# Patient Record
Sex: FEMALE | Race: WHITE | NOT HISPANIC OR LATINO | Employment: FULL TIME | URBAN - METROPOLITAN AREA
[De-identification: names, ages, dates, MRNs, and addresses within clinical notes are randomized per-mention and may not be internally consistent; named-entity substitution may affect disease eponyms.]

---

## 2023-09-06 ENCOUNTER — OFFICE VISIT (OUTPATIENT)
Dept: OBSTETRICS AND GYNECOLOGY | Age: 28
End: 2023-09-06
Payer: COMMERCIAL

## 2023-09-06 VITALS
WEIGHT: 213.8 LBS | DIASTOLIC BLOOD PRESSURE: 68 MMHG | HEIGHT: 64 IN | SYSTOLIC BLOOD PRESSURE: 118 MMHG | BODY MASS INDEX: 36.5 KG/M2

## 2023-09-06 DIAGNOSIS — Z34.01 ENCOUNTER FOR SUPERVISION OF NORMAL FIRST PREGNANCY IN FIRST TRIMESTER: Primary | ICD-10-CM

## 2023-09-06 RX ORDER — ALBUTEROL SULFATE 90 UG/1
2 AEROSOL, METERED RESPIRATORY (INHALATION) EVERY 6 HOURS PRN
COMMUNITY

## 2023-09-06 RX ORDER — BUSPIRONE HYDROCHLORIDE 10 MG/1
TABLET ORAL
COMMUNITY

## 2023-09-06 NOTE — PROGRESS NOTES
" New GYN Problem    Chief Complaint   Patient presents with    Gynecologic Exam     New GYN, Pregnancy confirmation, LMP unknown, U/S today, Pt C/O nausea with some vomiting        Wilda GILMAN is a 28 yr old G1, P0 who presents at 10 weeks 5 days for confirmation of pregnancy.  She notes that she has some mild nausea, occasional vomiting.  Does not feel as though she needs any medication at this time.  No cramping or bleeding  Otherwise is feeling well  She is present with her  CJ today  She is a teacher at [employer], teaches freshman    Histories  Past Medical History:   Diagnosis Date    Anxiety     Asthma     Depression     Environmental and seasonal allergies     Polycystic ovary syndrome     Irregular cycles       Past Surgical History:   Procedure Laterality Date    WISDOM TOOTH EXTRACTION         Family History   Problem Relation Age of Onset    Diabetes Father     Osteoporosis Maternal Aunt     Osteoporosis Maternal Uncle     Breast cancer Neg Hx     Ovarian cancer Neg Hx     Uterine cancer Neg Hx     Colon cancer Neg Hx     Deep vein thrombosis Neg Hx     Pulmonary embolism Neg Hx     Congenital heart disease Neg Hx        Social History     Socioeconomic History    Marital status: Significant Other   Tobacco Use    Smoking status: Never    Smokeless tobacco: Never   Vaping Use    Vaping Use: Never used   Substance and Sexual Activity    Alcohol use: Yes     Comment: OCC    Drug use: Never    Sexual activity: Yes     Partners: Male     Birth control/protection: None       OB History          1    Para        Term                AB        Living             SAB        IAB        Ectopic        Molar        Multiple        Live Births                    Physical Exam    /68   Ht 162.6 cm (64\")   Wt 97 kg (213 lb 12.8 oz)   LMP  (LMP Unknown)   BMI 36.70 kg/m²     BMI: Body mass index is 36.7 kg/m².     General:   alert, appears stated age, and cooperative   Neck Nontender, no " lymphadenopathy, no thyromegaly   Abdomen: soft, non-tender, without masses or organomegaly     Assessment/Plan    Diagnoses and all orders for this visit:    1. Encounter for supervision of normal first pregnancy in first trimester (Primary)  -     OB Panel With HIV  -     Varicella Zoster Antibody, IgG  -     Urine Culture - Urine, Urine, Clean Catch  -     Chlamydia trachomatis, Neisseria gonorrhoeae, Trichomonas vaginalis, PCR - Urine, Urine, Clean Catch  -     Hemoglobinopathy Fractionation [place]  -     Anila Horizon 14 (Pan-Ethnic Standard) - Blood, Blood, Venous  -     Anila Panorama Prenatal Test: Chromosomes 13, 18, 21, X & Y: Triploidy 22Q.11.2 Deletion - Blood, Blood, Venous      Discussed usual course of prenatal care, pregnancy do's and don'ts, discussed flu vaccine when it comes out.  She desires genetic screening and this was collected today  She has a family history of diabetes and this may put her at risk of gestational diabetes  Discussed recommended weight gain during pregnancy    Return for 3-4 ob intake please.      Stephanie Tadeo MD  09/06/2023  16:04 EDT

## 2023-09-07 LAB
ABO GROUP BLD: NORMAL
BASOPHILS # BLD AUTO: 0 X10E3/UL (ref 0–0.2)
BASOPHILS NFR BLD AUTO: 0 %
BLD GP AB SCN SERPL QL: NEGATIVE
EOSINOPHIL # BLD AUTO: 0.1 X10E3/UL (ref 0–0.4)
EOSINOPHIL NFR BLD AUTO: 1 %
ERYTHROCYTE [DISTWIDTH] IN BLOOD BY AUTOMATED COUNT: 12.8 % (ref 11.7–15.4)
HBV SURFACE AG SERPL QL IA: NEGATIVE
HCT VFR BLD AUTO: 36.4 % (ref 34–46.6)
HCV IGG SERPL QL IA: NON REACTIVE
HGB A MFR BLD ELPH: 97.4 % (ref 96.4–98.8)
HGB A2 MFR BLD ELPH: 2.6 % (ref 1.8–3.2)
HGB BLD-MCNC: 12.2 G/DL (ref 11.1–15.9)
HGB F MFR BLD ELPH: 0 % (ref 0–2)
HGB FRACT BLD-IMP: NORMAL
HGB S MFR BLD ELPH: 0 %
HIV 1+2 AB+HIV1 P24 AG SERPL QL IA: NON REACTIVE
IMM GRANULOCYTES # BLD AUTO: 0 X10E3/UL (ref 0–0.1)
IMM GRANULOCYTES NFR BLD AUTO: 0 %
LYMPHOCYTES # BLD AUTO: 2.2 X10E3/UL (ref 0.7–3.1)
LYMPHOCYTES NFR BLD AUTO: 24 %
MCH RBC QN AUTO: 29.2 PG (ref 26.6–33)
MCHC RBC AUTO-ENTMCNC: 33.5 G/DL (ref 31.5–35.7)
MCV RBC AUTO: 87 FL (ref 79–97)
MONOCYTES # BLD AUTO: 0.8 X10E3/UL (ref 0.1–0.9)
MONOCYTES NFR BLD AUTO: 9 %
NEUTROPHILS # BLD AUTO: 6.1 X10E3/UL (ref 1.4–7)
NEUTROPHILS NFR BLD AUTO: 66 %
PLATELET # BLD AUTO: 367 X10E3/UL (ref 150–450)
RBC # BLD AUTO: 4.18 X10E6/UL (ref 3.77–5.28)
RH BLD: POSITIVE
RPR SER QL: NON REACTIVE
RUBV IGG SERPL IA-ACNC: 14.4 INDEX
VZV IGG SER IA-ACNC: <135 INDEX
WBC # BLD AUTO: 9.2 X10E3/UL (ref 3.4–10.8)

## 2023-09-08 LAB
BACTERIA UR CULT: NORMAL
BACTERIA UR CULT: NORMAL
C TRACH RRNA SPEC QL NAA+PROBE: NEGATIVE
N GONORRHOEA RRNA SPEC QL NAA+PROBE: NEGATIVE
T VAGINALIS RRNA SPEC QL NAA+PROBE: NEGATIVE

## 2023-09-10 ENCOUNTER — PATIENT MESSAGE (OUTPATIENT)
Dept: OBSTETRICS AND GYNECOLOGY | Age: 28
End: 2023-09-10
Payer: COMMERCIAL

## 2023-09-12 RX ORDER — CLINDAMYCIN AND BENZOYL PEROXIDE 10; 50 MG/G; MG/G
1 GEL TOPICAL 2 TIMES DAILY
Qty: 35 G | Refills: 1 | Status: SHIPPED | OUTPATIENT
Start: 2023-09-12

## 2023-09-12 NOTE — TELEPHONE ENCOUNTER
From: Wilda GILMAN  To: Stephanie Tadeo  Sent: 9/10/2023 6:41 PM EDT  Subject: Acne     Hi! My face has been breaking out with acne on my cheeks and chin. I’ve never had acne this bad before. I’ve been using my usual face  and lotions. I also just started to add in some Ramírez’s skin therapy oil - which may help. But I was wondering if you had any suggestions to help.

## 2023-09-13 LAB
Lab: NEGATIVE
Lab: NORMAL
NTRA ALPHA-THALASSEMIA: NEGATIVE
NTRA BETA-HEMOGLOBINOPATHIES: NEGATIVE
NTRA CANAVAN DISEASE: NEGATIVE
NTRA CYSTIC FIBROSIS: NEGATIVE
NTRA DUCHENNE/BECKER MUSCULAR DYSTROPHY: NEGATIVE
NTRA FAMILIAL DYSAUTONOMIA: NEGATIVE
NTRA FRAGILE X SYNDROME: NEGATIVE
NTRA GALACTOSEMIA: NEGATIVE
NTRA GAUCHER DISEASE: NEGATIVE
NTRA MEDIUM CHAIN ACYL-COA DEHYDROGENASE DEFICIENCY: NEGATIVE
NTRA POLYCYSTIC KIDNEY DISEASE, AUTOSOMAL RECESSIVE: NEGATIVE
NTRA SMITH-LEMLI-OPITZ SYNDROME: NEGATIVE
NTRA SPINAL MUSCULAR ATROPHY: NEGATIVE
NTRA TAY-SACHS DISEASE: NEGATIVE

## 2023-09-27 ENCOUNTER — ROUTINE PRENATAL (OUTPATIENT)
Dept: OBSTETRICS AND GYNECOLOGY | Age: 28
End: 2023-09-27
Payer: COMMERCIAL

## 2023-09-27 VITALS — WEIGHT: 213.2 LBS | SYSTOLIC BLOOD PRESSURE: 122 MMHG | DIASTOLIC BLOOD PRESSURE: 74 MMHG | BODY MASS INDEX: 36.6 KG/M2

## 2023-09-27 DIAGNOSIS — Z13.89 SCREENING FOR BLOOD OR PROTEIN IN URINE: ICD-10-CM

## 2023-09-27 DIAGNOSIS — Z34.81 PRENATAL CARE, SUBSEQUENT PREGNANCY, FIRST TRIMESTER: ICD-10-CM

## 2023-09-27 DIAGNOSIS — Z01.419 ENCOUNTER FOR GYNECOLOGICAL EXAMINATION WITHOUT ABNORMAL FINDING: Primary | ICD-10-CM

## 2023-09-27 LAB
GLUCOSE UR STRIP-MCNC: NEGATIVE MG/DL
PROT UR STRIP-MCNC: NEGATIVE MG/DL

## 2023-09-27 NOTE — PROGRESS NOTES
Chief Complaint   Patient presents with    Routine Prenatal Visit     13w5d OB Check      Wilda Evans is doing well, no complaints  Genetics came back with no result, recollected today  FHR around 170  Pap collected today, normal closed cervix    Stephanie Tadeo MD  9/27/2023  14:20 EDT

## 2023-10-01 LAB
CONV .: NORMAL
CYTOLOGIST CVX/VAG CYTO: NORMAL
CYTOLOGY CVX/VAG DOC CYTO: NORMAL
CYTOLOGY CVX/VAG DOC THIN PREP: NORMAL
DX ICD CODE: NORMAL
HIV 1 & 2 AB SER-IMP: NORMAL
OTHER STN SPEC: NORMAL
STAT OF ADQ CVX/VAG CYTO-IMP: NORMAL

## 2023-10-19 ENCOUNTER — ROUTINE PRENATAL (OUTPATIENT)
Dept: OBSTETRICS AND GYNECOLOGY | Age: 28
End: 2023-10-19
Payer: COMMERCIAL

## 2023-10-19 VITALS — BODY MASS INDEX: 36.73 KG/M2 | SYSTOLIC BLOOD PRESSURE: 120 MMHG | DIASTOLIC BLOOD PRESSURE: 70 MMHG | WEIGHT: 214 LBS

## 2023-10-19 DIAGNOSIS — Z34.92 PRENATAL CARE IN SECOND TRIMESTER: Primary | ICD-10-CM

## 2023-10-19 DIAGNOSIS — Z3A.13 13 WEEKS GESTATION OF PREGNANCY: ICD-10-CM

## 2023-10-19 LAB
GLUCOSE UR STRIP-MCNC: NEGATIVE MG/DL
PROT UR STRIP-MCNC: ABNORMAL MG/DL

## 2023-10-19 NOTE — PROGRESS NOTES
Chief Complaint   Patient presents with    Routine Prenatal Visit     16 weeks  Cc:  no problems       HPI: 28 y.o.  at 16w6d     Doing well  No LOF or bleeding  Her repeat genetics still returned with insufficient fetal DNA  Pt of Dr. Tadeo       Vitals:    10/19/23 0948   BP: 120/70   Weight: 97.1 kg (214 lb)       ROS:  GI:  Negative  : Negative  Pulmonary: Negative     A/P  1. Intrauterine pregnancy at 16w6d   2. Pregnancy Risk:  NORMAL    Diagnoses and all orders for this visit:    1. Prenatal care in second trimester (Primary)    2. 13 weeks gestation of pregnancy  -     POC Urinalysis Dipstick    Other orders  -     Fluzone >6 Months (4222-4120)        -----------------------  PLAN:   Flu vaccine today  Message to Dr. Tadeo regarding f/u for genetics   Anatomy next visit  Return for Next scheduled follow up.      ORLANDO Nelson  10/19/2023 10:01 EDT

## 2023-11-08 ENCOUNTER — ROUTINE PRENATAL (OUTPATIENT)
Dept: OBSTETRICS AND GYNECOLOGY | Age: 28
End: 2023-11-08
Payer: COMMERCIAL

## 2023-11-08 VITALS — SYSTOLIC BLOOD PRESSURE: 116 MMHG | DIASTOLIC BLOOD PRESSURE: 74 MMHG | BODY MASS INDEX: 36.97 KG/M2 | WEIGHT: 215.4 LBS

## 2023-11-08 DIAGNOSIS — Z13.89 SCREENING FOR BLOOD OR PROTEIN IN URINE: Primary | ICD-10-CM

## 2023-11-08 DIAGNOSIS — Z34.82 ENCOUNTER FOR SUPERVISION OF OTHER NORMAL PREGNANCY IN SECOND TRIMESTER: ICD-10-CM

## 2023-11-08 LAB
GLUCOSE UR STRIP-MCNC: NEGATIVE MG/DL
PROT UR STRIP-MCNC: NEGATIVE MG/DL
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
## 2023-11-13 DIAGNOSIS — Z34.82 PRENATAL CARE, SUBSEQUENT PREGNANCY, SECOND TRIMESTER: Primary | ICD-10-CM

## 2023-12-06 ENCOUNTER — ROUTINE PRENATAL (OUTPATIENT)
Dept: OBSTETRICS AND GYNECOLOGY | Age: 28
End: 2023-12-06
Payer: COMMERCIAL

## 2023-12-06 VITALS — DIASTOLIC BLOOD PRESSURE: 68 MMHG | BODY MASS INDEX: 37.45 KG/M2 | SYSTOLIC BLOOD PRESSURE: 120 MMHG | WEIGHT: 218.2 LBS

## 2023-12-06 DIAGNOSIS — J45.909 ASTHMA DURING PREGNANCY: ICD-10-CM

## 2023-12-06 DIAGNOSIS — Z34.82 ENCOUNTER FOR SUPERVISION OF OTHER NORMAL PREGNANCY IN SECOND TRIMESTER: ICD-10-CM

## 2023-12-06 DIAGNOSIS — Z13.89 SCREENING FOR BLOOD OR PROTEIN IN URINE: Primary | ICD-10-CM

## 2023-12-06 DIAGNOSIS — O99.519 ASTHMA DURING PREGNANCY: ICD-10-CM

## 2023-12-06 DIAGNOSIS — L70.9 ACNE, UNSPECIFIED ACNE TYPE: ICD-10-CM

## 2023-12-06 PROBLEM — Z34.90 SUPERVISION OF NORMAL PREGNANCY: Status: ACTIVE | Noted: 2023-12-06

## 2023-12-06 LAB
GLUCOSE UR STRIP-MCNC: NEGATIVE MG/DL
PROT UR STRIP-MCNC: ABNORMAL MG/DL

## 2023-12-06 RX ORDER — CLINDAMYCIN AND BENZOYL PEROXIDE 10; 50 MG/G; MG/G
1 GEL TOPICAL 2 TIMES DAILY
Qty: 35 G | Refills: 11 | Status: SHIPPED | OUTPATIENT
Start: 2023-12-06

## 2023-12-07 NOTE — PROGRESS NOTES
Chief Complaint   Patient presents with    Routine Prenatal Visit     23w5d OB Check with repeat anatomy us      Wilda Evans is doing well.  Her anatomy screen follow-up today is normal and complete  Her genetic screening was low risk and male  The BenzaClin acne treatment has been working well for her, she just needs a refill of this and it was sent in today    Follow-up in about 4 weeks with GCT    Stephanie Tadeo MD  12/7/2023  14:20 EST

## 2024-01-03 ENCOUNTER — ROUTINE PRENATAL (OUTPATIENT)
Dept: OBSTETRICS AND GYNECOLOGY | Age: 29
End: 2024-01-03
Payer: COMMERCIAL

## 2024-01-03 VITALS — WEIGHT: 220 LBS | BODY MASS INDEX: 37.76 KG/M2 | DIASTOLIC BLOOD PRESSURE: 70 MMHG | SYSTOLIC BLOOD PRESSURE: 118 MMHG

## 2024-01-03 DIAGNOSIS — Z13.1 SCREENING FOR DIABETES MELLITUS: ICD-10-CM

## 2024-01-03 DIAGNOSIS — Z13.0 SCREENING FOR IRON DEFICIENCY ANEMIA: ICD-10-CM

## 2024-01-03 DIAGNOSIS — Z34.82 ENCOUNTER FOR SUPERVISION OF OTHER NORMAL PREGNANCY IN SECOND TRIMESTER: ICD-10-CM

## 2024-01-03 DIAGNOSIS — Z13.89 SCREENING FOR BLOOD OR PROTEIN IN URINE: Primary | ICD-10-CM

## 2024-01-03 LAB
GLUCOSE UR STRIP-MCNC: NEGATIVE MG/DL
PROT UR STRIP-MCNC: NEGATIVE MG/DL

## 2024-01-03 NOTE — PROGRESS NOTES
Chief Complaint   Patient presents with    Routine Prenatal Visit     27w5d OB Check, 1 HR GTT     Wilda Cristina is doing well. Normal movement and uterus growing  GCT, tdap today  Epidural, breast, likely ocps PP  Discussed signing up for classes and picking peds  FU 2 wks    Stephanie Tadeo MD  1/3/2024  11:37 EST

## 2024-01-04 LAB
BASOPHILS # BLD AUTO: 0 X10E3/UL (ref 0–0.2)
BASOPHILS NFR BLD AUTO: 0 %
EOSINOPHIL # BLD AUTO: 0 X10E3/UL (ref 0–0.4)
EOSINOPHIL NFR BLD AUTO: 0 %
ERYTHROCYTE [DISTWIDTH] IN BLOOD BY AUTOMATED COUNT: 12.9 % (ref 11.7–15.4)
GLUCOSE 1H P 50 G GLC PO SERPL-MCNC: 100 MG/DL (ref 70–139)
HCT VFR BLD AUTO: 36.7 % (ref 34–46.6)
HGB BLD-MCNC: 12.5 G/DL (ref 11.1–15.9)
IMM GRANULOCYTES # BLD AUTO: 0.1 X10E3/UL (ref 0–0.1)
IMM GRANULOCYTES NFR BLD AUTO: 1 %
LYMPHOCYTES # BLD AUTO: 1.4 X10E3/UL (ref 0.7–3.1)
LYMPHOCYTES NFR BLD AUTO: 13 %
MCH RBC QN AUTO: 29.2 PG (ref 26.6–33)
MCHC RBC AUTO-ENTMCNC: 34.1 G/DL (ref 31.5–35.7)
MCV RBC AUTO: 86 FL (ref 79–97)
MONOCYTES # BLD AUTO: 0.6 X10E3/UL (ref 0.1–0.9)
MONOCYTES NFR BLD AUTO: 6 %
NEUTROPHILS # BLD AUTO: 8.5 X10E3/UL (ref 1.4–7)
NEUTROPHILS NFR BLD AUTO: 80 %
PLATELET # BLD AUTO: 337 X10E3/UL (ref 150–450)
RBC # BLD AUTO: 4.28 X10E6/UL (ref 3.77–5.28)
RPR SER QL: NON REACTIVE
WBC # BLD AUTO: 10.7 X10E3/UL (ref 3.4–10.8)

## 2024-01-17 ENCOUNTER — ROUTINE PRENATAL (OUTPATIENT)
Dept: OBSTETRICS AND GYNECOLOGY | Age: 29
End: 2024-01-17
Payer: COMMERCIAL

## 2024-01-17 VITALS — DIASTOLIC BLOOD PRESSURE: 74 MMHG | BODY MASS INDEX: 38.79 KG/M2 | SYSTOLIC BLOOD PRESSURE: 120 MMHG | WEIGHT: 226 LBS

## 2024-01-17 DIAGNOSIS — Z13.89 SCREENING FOR BLOOD OR PROTEIN IN URINE: Primary | ICD-10-CM

## 2024-01-17 DIAGNOSIS — Z34.82 ENCOUNTER FOR SUPERVISION OF OTHER NORMAL PREGNANCY IN SECOND TRIMESTER: ICD-10-CM

## 2024-01-17 LAB
GLUCOSE UR STRIP-MCNC: NEGATIVE MG/DL
PROT UR STRIP-MCNC: NEGATIVE MG/DL

## 2024-01-17 NOTE — PROGRESS NOTES
Chief Complaint   Patient presents with    Routine Prenatal Visit     29w5d OB Check      Wilda Evans is doing well, just notes increase in fatigue lately.  Normal fetal movement.  No significant contractions noted.    Normal glucose and no anemia  Discussed third trimester fatigue, if needs to stop work can do so during the third trimester  Desires epidural, plans to breast-feed  Has signed up for classes    Follow-up in 2 weeks with RSV    Stephanie Tadeo MD  1/17/2024  14:23 EST

## 2024-01-21 ENCOUNTER — PATIENT MESSAGE (OUTPATIENT)
Dept: OBSTETRICS AND GYNECOLOGY | Age: 29
End: 2024-01-21
Payer: COMMERCIAL

## 2024-01-22 NOTE — TELEPHONE ENCOUNTER
From: Wilda Evans  To: Stephanie Tadeo  Sent: 1/21/2024 2:33 PM EST  Subject: Colostrum coming in!     Is it too early to collect colostrum? It’s mostly just dripping occasionally on its own but there’s a good amount if I hand express.

## 2024-01-31 ENCOUNTER — ROUTINE PRENATAL (OUTPATIENT)
Dept: OBSTETRICS AND GYNECOLOGY | Age: 29
End: 2024-01-31
Payer: COMMERCIAL

## 2024-01-31 VITALS — DIASTOLIC BLOOD PRESSURE: 68 MMHG | BODY MASS INDEX: 39.82 KG/M2 | WEIGHT: 232 LBS | SYSTOLIC BLOOD PRESSURE: 120 MMHG

## 2024-01-31 DIAGNOSIS — Z34.83 ENCOUNTER FOR SUPERVISION OF OTHER NORMAL PREGNANCY IN THIRD TRIMESTER: ICD-10-CM

## 2024-01-31 DIAGNOSIS — Z13.89 SCREENING FOR BLOOD OR PROTEIN IN URINE: Primary | ICD-10-CM

## 2024-01-31 LAB
GLUCOSE UR STRIP-MCNC: NEGATIVE MG/DL
PROT UR STRIP-MCNC: ABNORMAL MG/DL

## 2024-02-17 ENCOUNTER — HOSPITAL ENCOUNTER (INPATIENT)
Facility: HOSPITAL | Age: 29
LOS: 6 days | Discharge: HOME | End: 2024-02-23
Attending: OBSTETRICS & GYNECOLOGY | Admitting: OBSTETRICS & GYNECOLOGY
Payer: COMMERCIAL

## 2024-02-17 ENCOUNTER — ANESTHESIA (OUTPATIENT)
Dept: LABOR AND DELIVERY | Facility: HOSPITAL | Age: 29
End: 2024-02-17
Payer: COMMERCIAL

## 2024-02-17 ENCOUNTER — ANESTHESIA EVENT (OUTPATIENT)
Dept: LABOR AND DELIVERY | Facility: HOSPITAL | Age: 29
End: 2024-02-17
Payer: COMMERCIAL

## 2024-02-17 PROBLEM — O14.13 PREECLAMPSIA, SEVERE, THIRD TRIMESTER: Status: ACTIVE | Noted: 2024-02-17

## 2024-02-17 PROBLEM — Z34.90 PREGNANCY: Status: ACTIVE | Noted: 2024-02-17

## 2024-02-17 PROBLEM — O14.13 SEVERE PRE-ECLAMPSIA IN THIRD TRIMESTER: Status: ACTIVE | Noted: 2024-02-17

## 2024-02-17 LAB
ABO GROUP BLD: NORMAL
ALBUMIN SERPL-MCNC: 3.6 G/DL (ref 3.5–5.2)
ALBUMIN/GLOB SERPL: 1 G/DL
ALP SERPL-CCNC: 178 U/L (ref 39–117)
ALT SERPL W P-5'-P-CCNC: 43 U/L (ref 1–33)
ANION GAP SERPL CALCULATED.3IONS-SCNC: 13.7 MMOL/L (ref 5–15)
AST SERPL-CCNC: 39 U/L (ref 1–32)
BACTERIA UR QL AUTO: ABNORMAL /HPF
BASOPHILS # BLD AUTO: 0.03 10*3/MM3 (ref 0–0.2)
BASOPHILS NFR BLD AUTO: 0.2 % (ref 0–1.5)
BILIRUB SERPL-MCNC: 0.3 MG/DL (ref 0–1.2)
BILIRUB UR QL STRIP: NEGATIVE
BLD GP AB SCN SERPL QL: NEGATIVE
BUN SERPL-MCNC: 11 MG/DL (ref 6–20)
BUN/CREAT SERPL: 17.5 (ref 7–25)
CALCIUM SPEC-SCNC: 8.9 MG/DL (ref 8.6–10.5)
CHLORIDE SERPL-SCNC: 102 MMOL/L (ref 98–107)
CLARITY UR: ABNORMAL
CO2 SERPL-SCNC: 20.3 MMOL/L (ref 22–29)
COLOR UR: YELLOW
CREAT SERPL-MCNC: 0.63 MG/DL (ref 0.57–1)
CREAT UR-MCNC: 179.2 MG/DL
DEPRECATED RDW RBC AUTO: 39.9 FL (ref 37–54)
EGFRCR SERPLBLD CKD-EPI 2021: 123.3 ML/MIN/1.73
EOSINOPHIL # BLD AUTO: 0.08 10*3/MM3 (ref 0–0.4)
EOSINOPHIL NFR BLD AUTO: 0.6 % (ref 0.3–6.2)
ERYTHROCYTE [DISTWIDTH] IN BLOOD BY AUTOMATED COUNT: 13 % (ref 12.3–15.4)
EXTERNAL GROUP B STREP ANTIGEN: NORMAL
GLOBULIN UR ELPH-MCNC: 3.6 GM/DL
GLUCOSE SERPL-MCNC: 87 MG/DL (ref 65–99)
GLUCOSE UR STRIP-MCNC: NEGATIVE MG/DL
HCT VFR BLD AUTO: 36.6 % (ref 34–46.6)
HGB BLD-MCNC: 11.9 G/DL (ref 12–15.9)
HGB UR QL STRIP.AUTO: NEGATIVE
HYALINE CASTS UR QL AUTO: ABNORMAL /LPF
IMM GRANULOCYTES # BLD AUTO: 0.13 10*3/MM3 (ref 0–0.05)
IMM GRANULOCYTES NFR BLD AUTO: 1.1 % (ref 0–0.5)
KETONES UR QL STRIP: NEGATIVE
LEUKOCYTE ESTERASE UR QL STRIP.AUTO: ABNORMAL
LYMPHOCYTES # BLD AUTO: 1.63 10*3/MM3 (ref 0.7–3.1)
LYMPHOCYTES NFR BLD AUTO: 13.2 % (ref 19.6–45.3)
MCH RBC QN AUTO: 27.8 PG (ref 26.6–33)
MCHC RBC AUTO-ENTMCNC: 32.5 G/DL (ref 31.5–35.7)
MCV RBC AUTO: 85.5 FL (ref 79–97)
MONOCYTES # BLD AUTO: 1 10*3/MM3 (ref 0.1–0.9)
MONOCYTES NFR BLD AUTO: 8.1 % (ref 5–12)
NEUTROPHILS NFR BLD AUTO: 76.8 % (ref 42.7–76)
NEUTROPHILS NFR BLD AUTO: 9.49 10*3/MM3 (ref 1.7–7)
NITRITE UR QL STRIP: NEGATIVE
NRBC BLD AUTO-RTO: 0.1 /100 WBC (ref 0–0.2)
PH UR STRIP.AUTO: 6.5 [PH] (ref 5–8)
PLATELET # BLD AUTO: 278 10*3/MM3 (ref 140–450)
PMV BLD AUTO: 10.9 FL (ref 6–12)
POTASSIUM SERPL-SCNC: 4 MMOL/L (ref 3.5–5.2)
PROT ?TM UR-MCNC: 66.7 MG/DL
PROT SERPL-MCNC: 7.2 G/DL (ref 6–8.5)
PROT UR QL STRIP: ABNORMAL
PROT/CREAT UR: 372.2 MG/G CREA (ref 0–200)
RBC # BLD AUTO: 4.28 10*6/MM3 (ref 3.77–5.28)
RBC # UR STRIP: ABNORMAL /HPF
REF LAB TEST METHOD: ABNORMAL
RH BLD: POSITIVE
SODIUM SERPL-SCNC: 136 MMOL/L (ref 136–145)
SP GR UR STRIP: 1.02 (ref 1–1.03)
SQUAMOUS #/AREA URNS HPF: ABNORMAL /HPF
T PALLIDUM IGG SER QL: NORMAL
T&S EXPIRATION DATE: NORMAL
UROBILINOGEN UR QL STRIP: ABNORMAL
WBC # UR STRIP: ABNORMAL /HPF
WBC NRBC COR # BLD AUTO: 12.36 10*3/MM3 (ref 3.4–10.8)

## 2024-02-17 PROCEDURE — 82570 ASSAY OF URINE CREATININE: CPT | Performed by: OBSTETRICS & GYNECOLOGY

## 2024-02-17 PROCEDURE — 86900 BLOOD TYPING SEROLOGIC ABO: CPT | Performed by: STUDENT IN AN ORGANIZED HEALTH CARE EDUCATION/TRAINING PROGRAM

## 2024-02-17 PROCEDURE — 99202 OFFICE O/P NEW SF 15 MIN: CPT | Performed by: OBSTETRICS & GYNECOLOGY

## 2024-02-17 PROCEDURE — 84156 ASSAY OF PROTEIN URINE: CPT | Performed by: OBSTETRICS & GYNECOLOGY

## 2024-02-17 PROCEDURE — 25010000002 BETAMETHASONE ACET & SOD PHOS PER 4 MG: Performed by: OBSTETRICS & GYNECOLOGY

## 2024-02-17 PROCEDURE — 86780 TREPONEMA PALLIDUM: CPT | Performed by: STUDENT IN AN ORGANIZED HEALTH CARE EDUCATION/TRAINING PROGRAM

## 2024-02-17 PROCEDURE — 81001 URINALYSIS AUTO W/SCOPE: CPT | Performed by: OBSTETRICS & GYNECOLOGY

## 2024-02-17 PROCEDURE — 25010000002 MAGNESIUM SULFATE 20 GM/500ML SOLUTION: Performed by: OBSTETRICS & GYNECOLOGY

## 2024-02-17 PROCEDURE — 86850 RBC ANTIBODY SCREEN: CPT | Performed by: STUDENT IN AN ORGANIZED HEALTH CARE EDUCATION/TRAINING PROGRAM

## 2024-02-17 PROCEDURE — 87081 CULTURE SCREEN ONLY: CPT | Performed by: STUDENT IN AN ORGANIZED HEALTH CARE EDUCATION/TRAINING PROGRAM

## 2024-02-17 PROCEDURE — 85025 COMPLETE CBC W/AUTO DIFF WBC: CPT | Performed by: OBSTETRICS & GYNECOLOGY

## 2024-02-17 PROCEDURE — 86901 BLOOD TYPING SEROLOGIC RH(D): CPT | Performed by: STUDENT IN AN ORGANIZED HEALTH CARE EDUCATION/TRAINING PROGRAM

## 2024-02-17 PROCEDURE — 80053 COMPREHEN METABOLIC PANEL: CPT | Performed by: OBSTETRICS & GYNECOLOGY

## 2024-02-17 PROCEDURE — 87086 URINE CULTURE/COLONY COUNT: CPT | Performed by: OBSTETRICS & GYNECOLOGY

## 2024-02-17 RX ORDER — PENICILLIN G 3000000 [IU]/50ML
3 INJECTION, SOLUTION INTRAVENOUS EVERY 4 HOURS
Status: DISCONTINUED | OUTPATIENT
Start: 2024-02-18 | End: 2024-02-19

## 2024-02-17 RX ORDER — MAGNESIUM SULFATE HEPTAHYDRATE 40 MG/ML
2 INJECTION, SOLUTION INTRAVENOUS CONTINUOUS
Status: DISCONTINUED | OUTPATIENT
Start: 2024-02-17 | End: 2024-02-19

## 2024-02-17 RX ORDER — ONDANSETRON 2 MG/ML
4 INJECTION INTRAMUSCULAR; INTRAVENOUS EVERY 8 HOURS PRN
Status: DISCONTINUED | OUTPATIENT
Start: 2024-02-17 | End: 2024-02-19 | Stop reason: HOSPADM

## 2024-02-17 RX ORDER — ONDANSETRON 4 MG/1
8 TABLET, ORALLY DISINTEGRATING ORAL EVERY 8 HOURS PRN
Status: DISCONTINUED | OUTPATIENT
Start: 2024-02-17 | End: 2024-02-19 | Stop reason: HOSPADM

## 2024-02-17 RX ORDER — CALCIUM CARBONATE 500 MG/1
2 TABLET, CHEWABLE ORAL DAILY PRN
Status: DISCONTINUED | OUTPATIENT
Start: 2024-02-17 | End: 2024-02-19 | Stop reason: HOSPADM

## 2024-02-17 RX ORDER — LIDOCAINE HYDROCHLORIDE 10 MG/ML
0.5 INJECTION, SOLUTION INFILTRATION; PERINEURAL ONCE AS NEEDED
Status: DISCONTINUED | OUTPATIENT
Start: 2024-02-17 | End: 2024-02-19

## 2024-02-17 RX ORDER — EPHEDRINE SULFATE 50 MG/ML
5 INJECTION, SOLUTION INTRAVENOUS
Status: DISCONTINUED | OUTPATIENT
Start: 2024-02-17 | End: 2024-02-19 | Stop reason: HOSPADM

## 2024-02-17 RX ORDER — NIFEDIPINE 10 MG/1
10-20 CAPSULE ORAL
Status: DISCONTINUED | OUTPATIENT
Start: 2024-02-17 | End: 2024-02-19

## 2024-02-17 RX ORDER — SODIUM CHLORIDE 0.9 % (FLUSH) 0.9 %
10 SYRINGE (ML) INJECTION EVERY 12 HOURS SCHEDULED
Status: DISCONTINUED | OUTPATIENT
Start: 2024-02-17 | End: 2024-02-19

## 2024-02-17 RX ORDER — PENICILLIN G 3000000 [IU]/50ML
3 INJECTION, SOLUTION INTRAVENOUS EVERY 4 HOURS
Status: DISCONTINUED | OUTPATIENT
Start: 2024-02-17 | End: 2024-02-17

## 2024-02-17 RX ORDER — BISACODYL 10 MG
10 SUPPOSITORY, RECTAL RECTAL DAILY PRN
Status: DISCONTINUED | OUTPATIENT
Start: 2024-02-17 | End: 2024-02-19 | Stop reason: HOSPADM

## 2024-02-17 RX ORDER — ACETAMINOPHEN 325 MG/1
650 TABLET ORAL EVERY 4 HOURS PRN
Status: DISCONTINUED | OUTPATIENT
Start: 2024-02-17 | End: 2024-02-19 | Stop reason: HOSPADM

## 2024-02-17 RX ORDER — BETAMETHASONE SODIUM PHOSPHATE AND BETAMETHASONE ACETATE 3; 3 MG/ML; MG/ML
12 INJECTION, SUSPENSION INTRA-ARTICULAR; INTRALESIONAL; INTRAMUSCULAR; SOFT TISSUE EVERY 24 HOURS
Status: COMPLETED | OUTPATIENT
Start: 2024-02-17 | End: 2024-02-18

## 2024-02-17 RX ORDER — ONDANSETRON 2 MG/ML
4 INJECTION INTRAMUSCULAR; INTRAVENOUS EVERY 6 HOURS PRN
Status: DISCONTINUED | OUTPATIENT
Start: 2024-02-17 | End: 2024-02-19

## 2024-02-17 RX ORDER — HYDRALAZINE HYDROCHLORIDE 20 MG/ML
5-10 INJECTION INTRAMUSCULAR; INTRAVENOUS
Status: DISCONTINUED | OUTPATIENT
Start: 2024-02-17 | End: 2024-02-19

## 2024-02-17 RX ORDER — DIPHENHYDRAMINE HYDROCHLORIDE 50 MG/ML
25 INJECTION INTRAMUSCULAR; INTRAVENOUS NIGHTLY PRN
Status: DISCONTINUED | OUTPATIENT
Start: 2024-02-17 | End: 2024-02-19

## 2024-02-17 RX ORDER — OMEPRAZOLE 20 MG/1
20 CAPSULE, DELAYED RELEASE ORAL DAILY
COMMUNITY

## 2024-02-17 RX ORDER — FENTANYL CIT 0.2 MG/100ML-ROPIV 0.2%-NACL 0.9% EPIDURAL INJ 2/0.2 MCG/ML-%
10 SOLUTION INJECTION CONTINUOUS
Status: DISCONTINUED | OUTPATIENT
Start: 2024-02-17 | End: 2024-02-19

## 2024-02-17 RX ORDER — MISOPROSTOL 100 MCG
25 TABLET ORAL EVERY 4 HOURS
Status: DISPENSED | OUTPATIENT
Start: 2024-02-18 | End: 2024-02-18

## 2024-02-17 RX ORDER — ONDANSETRON 4 MG/1
4 TABLET, ORALLY DISINTEGRATING ORAL EVERY 6 HOURS PRN
Status: DISCONTINUED | OUTPATIENT
Start: 2024-02-17 | End: 2024-02-19

## 2024-02-17 RX ORDER — SODIUM CHLORIDE 9 MG/ML
40 INJECTION, SOLUTION INTRAVENOUS AS NEEDED
Status: DISCONTINUED | OUTPATIENT
Start: 2024-02-17 | End: 2024-02-19

## 2024-02-17 RX ORDER — LIDOCAINE HYDROCHLORIDE 10 MG/ML
0.5 INJECTION, SOLUTION INFILTRATION; PERINEURAL ONCE AS NEEDED
Status: DISCONTINUED | OUTPATIENT
Start: 2024-02-17 | End: 2024-02-19 | Stop reason: HOSPADM

## 2024-02-17 RX ORDER — SODIUM CHLORIDE 0.9 % (FLUSH) 0.9 %
10 SYRINGE (ML) INJECTION AS NEEDED
Status: DISCONTINUED | OUTPATIENT
Start: 2024-02-17 | End: 2024-02-19

## 2024-02-17 RX ORDER — ONDANSETRON 2 MG/ML
4 INJECTION INTRAMUSCULAR; INTRAVENOUS ONCE AS NEEDED
Status: DISCONTINUED | OUTPATIENT
Start: 2024-02-17 | End: 2024-02-19 | Stop reason: HOSPADM

## 2024-02-17 RX ORDER — CYCLOBENZAPRINE HCL 10 MG
10 TABLET ORAL 3 TIMES DAILY
Status: DISCONTINUED | OUTPATIENT
Start: 2024-02-17 | End: 2024-02-17

## 2024-02-17 RX ORDER — FAMOTIDINE 10 MG/ML
20 INJECTION, SOLUTION INTRAVENOUS ONCE AS NEEDED
Status: DISCONTINUED | OUTPATIENT
Start: 2024-02-17 | End: 2024-02-19 | Stop reason: HOSPADM

## 2024-02-17 RX ORDER — TERBUTALINE SULFATE 1 MG/ML
0.25 INJECTION, SOLUTION SUBCUTANEOUS AS NEEDED
Status: DISCONTINUED | OUTPATIENT
Start: 2024-02-17 | End: 2024-02-19

## 2024-02-17 RX ORDER — NIFEDIPINE 10 MG/1
10 CAPSULE ORAL ONCE
Status: COMPLETED | OUTPATIENT
Start: 2024-02-17 | End: 2024-02-17

## 2024-02-17 RX ORDER — ACETAMINOPHEN 325 MG/1
650 TABLET ORAL EVERY 4 HOURS PRN
Status: DISCONTINUED | OUTPATIENT
Start: 2024-02-17 | End: 2024-02-19

## 2024-02-17 RX ORDER — PRENATAL VIT NO.126/IRON/FOLIC 28MG-0.8MG
1 TABLET ORAL DAILY
COMMUNITY

## 2024-02-17 RX ORDER — CYCLOBENZAPRINE HCL 10 MG
10 TABLET ORAL 3 TIMES DAILY PRN
Status: DISCONTINUED | OUTPATIENT
Start: 2024-02-17 | End: 2024-02-19

## 2024-02-17 RX ORDER — DIPHENHYDRAMINE HCL 25 MG
25 CAPSULE ORAL NIGHTLY PRN
Status: DISCONTINUED | OUTPATIENT
Start: 2024-02-17 | End: 2024-02-19

## 2024-02-17 RX ORDER — SODIUM CHLORIDE, SODIUM LACTATE, POTASSIUM CHLORIDE, CALCIUM CHLORIDE 600; 310; 30; 20 MG/100ML; MG/100ML; MG/100ML; MG/100ML
75 INJECTION, SOLUTION INTRAVENOUS CONTINUOUS
Status: DISCONTINUED | OUTPATIENT
Start: 2024-02-17 | End: 2024-02-19

## 2024-02-17 RX ORDER — DIPHENHYDRAMINE HYDROCHLORIDE 50 MG/ML
12.5 INJECTION INTRAMUSCULAR; INTRAVENOUS EVERY 8 HOURS PRN
Status: DISCONTINUED | OUTPATIENT
Start: 2024-02-17 | End: 2024-02-19 | Stop reason: HOSPADM

## 2024-02-17 RX ORDER — DOCUSATE SODIUM 100 MG/1
100 CAPSULE, LIQUID FILLED ORAL 2 TIMES DAILY PRN
Status: DISCONTINUED | OUTPATIENT
Start: 2024-02-17 | End: 2024-02-19 | Stop reason: HOSPADM

## 2024-02-17 RX ORDER — LABETALOL HYDROCHLORIDE 5 MG/ML
20-80 INJECTION, SOLUTION INTRAVENOUS
Status: DISCONTINUED | OUTPATIENT
Start: 2024-02-17 | End: 2024-02-23 | Stop reason: HOSPADM

## 2024-02-17 RX ADMIN — ACETAMINOPHEN 325MG 650 MG: 325 TABLET ORAL at 17:38

## 2024-02-17 RX ADMIN — CYCLOBENZAPRINE 10 MG: 10 TABLET, FILM COATED ORAL at 17:03

## 2024-02-17 RX ADMIN — NIFEDIPINE 20 MG: 10 CAPSULE ORAL at 11:40

## 2024-02-17 RX ADMIN — ACETAMINOPHEN 325MG 650 MG: 325 TABLET ORAL at 22:24

## 2024-02-17 RX ADMIN — ACETAMINOPHEN 325MG 650 MG: 325 TABLET ORAL at 13:47

## 2024-02-17 RX ADMIN — DINOPROSTONE 10 MG: 10 INSERT VAGINAL at 13:35

## 2024-02-17 RX ADMIN — BETAMETHASONE SODIUM PHOSPHATE AND BETAMETHASONE ACETATE 12 MG: 3; 3 INJECTION, SUSPENSION INTRA-ARTICULAR; INTRALESIONAL; INTRAMUSCULAR at 10:42

## 2024-02-17 RX ADMIN — NIFEDIPINE 10 MG: 10 CAPSULE ORAL at 10:56

## 2024-02-17 RX ADMIN — MAGNESIUM SULFATE HEPTAHYDRATE 2 G/HR: 40 INJECTION, SOLUTION INTRAVENOUS at 19:50

## 2024-02-17 RX ADMIN — CYCLOBENZAPRINE 10 MG: 10 TABLET, FILM COATED ORAL at 10:54

## 2024-02-17 NOTE — CONSULTS
Prenatal Consult    Referring OB:  Jeremiah  Principal Problem:    Pregnancy 34 weeks completed     Severe pre-eclampsia in third trimester    Preeclampsia, severe, third trimester    Reason for Consultation: potential premature single at 34 weeks gestation    Maternal History:   First last name is a 28 y.o. yr/o  with: preeclampsia.  The  EDC is  Estimated Date of Delivery: Estimated Date of Delivery: 3/29/24    Consult:  maternal grandmother and mother available for discussion.  We reviewed the fetal and  aspects of the above conditions to include: estimated survival rate, beneficial effects of steroids, intraventricular hemorrhage, retinopathy of prematurity, anemia of prematurity, feeding difficulty, temperature instability, jaundice, and hypoglycemia. Length of stay estimated, visitation policy of nicu.     Estimated length of stay: [place]  Discussed the importance of providing human milk for pre-term and late pre-term infants: yes, mother plans to breastfeed  Reviewed policies and procedures for NICU/ICN  Reviewed structure and function of [facility]     We will plan to attend delivery when requested.    Plan for  resuscitation: full resuscitation    Additional comments: Its a boyEduard    Thank you for allowing us to participate in the care of your patient.     Roselyn Rebollar, APRN  24  16:53 EST      35 minutes were spent in consultation, greater than 95% face to face time.

## 2024-02-17 NOTE — H&P
[facility]   Obstetrics and Gynecology   History & Physical    2024    Patient: Wilda Evans          MR#:[id number]    Chief complaint:  neck pain    Subjective     28 y.o. female  at 34w1d presented to ED with neck pain and was incidentally found to have severe range blood pressures.  She reports that pain started in back of neck, radiated down her back but is now starting to spread across head.  Denies vision changes, shortness of air, and midepigastric/RUQ pain.  Endorses regular fetal movements.  Denies contractions, loss of fluid, and vaginal bleeding.  Beyond this, pregnancy has been uncomplicated.        Patient Active Problem List   Diagnosis    Asthma during pregnancy    Supervision of normal pregnancy    Acne    Pregnancy    Severe pre-eclampsia in third trimester       Past Medical History:   Diagnosis Date    Anxiety     Asthma     Depression     Environmental and seasonal allergies     Polycystic ovary syndrome     Irregular cycles    Pregnancy 2024    Severe pre-eclampsia in third trimester 2024       Past Surgical History:   Procedure Laterality Date    WISDOM TOOTH EXTRACTION         Obstetric History:  OB History          1    Para        Term                AB        Living             SAB        IAB        Ectopic        Molar        Multiple        Live Births                   Menstrual History:     No LMP recorded (lmp unknown). Patient is pregnant.       # 1 - Date: None, Sex: None, Weight: None, GA: None, Delivery: None, Apgar1: None, Apgar5: None, Living: None, Birth Comments: None      Prenatal Information:  Prenatal Results       Initial Prenatal Labs       Test Value Reference Range Date Time    Hemoglobin  12.2 g/dL 11.1 - 15.9 23 1505    Hematocrit  36.4 % 34.0 - 46.6 23 1505    Platelets  367 x10E3/uL 150 - 450 23 1505    Rubella IgG  14.40 index Immune >0.99 23 1505    Hepatitis B SAg  Negative  Negative  09/06/23 1505    Hepatitis C Ab  Non Reactive  Non Reactive 09/06/23 1505    RPR  Non Reactive  Non Reactive 01/03/24 1220       Non Reactive  Non Reactive 09/06/23 1505    T. Pallidum Ab   Non-Reactive  Non-Reactive 02/17/24 1054    ABO  A   09/06/23 1505    Rh  Positive   09/06/23 1505    Antibody Screen  Negative  Negative 09/06/23 1505    HIV  Non Reactive  Non Reactive 09/06/23 1505    Urine Culture  Final report   09/06/23 1541    Gonorrhea  Negative  Negative 09/06/23 1539    Chlamydia  Negative  Negative 09/06/23 1539    TSH ^ 1.45 uIU/mL 0.35 - 3.74 12/31/19 1121    HgB A1c         Varicella IgG  <135 index Immune >165 09/06/23 1505    HgB Electrophoresis         Cystic fibrosis                   Fetal testing        Test Value Reference Range Date Time    NIPT        MSAFP        AFP-4                  2nd and 3rd Trimester       Test Value Reference Range Date Time    Hemoglobin (repeated)  11.9 g/dL 12.0 - 15.9 02/17/24 1026       12.5 g/dL 11.1 - 15.9 01/03/24 1220    Hematocrit (repeated)  36.6 % 34.0 - 46.6 02/17/24 1026       36.7 % 34.0 - 46.6 01/03/24 1220    Platelets   278 10*3/mm3 140 - 450 02/17/24 1026       337 x10E3/uL 150 - 450 01/03/24 1220       367 x10E3/uL 150 - 450 09/06/23 1505    GCT  100 mg/dL 70 - 139 01/03/24 1220    Antibody Screen (repeated)        Third Trimester syphilis screen (repeated)   Non Reactive  Non Reactive 01/03/24 1220    GTT Fasting        GTT 1 Hr        GTT 2 Hr        GTT 3 Hr        Group B Strep                  Other testing        Test Value Reference Range Date Time    Parvo IgG         CMV IgG                   Drug Screening       Test Value Reference Range Date Time    Amphetamine Screen        Barbiturate Screen        Benzodiazepine Screen        Methadone Screen        Phencyclidine Screen        Opiates Screen        THC Screen        Cocaine Screen        Propoxyphene Screen        Buprenorphine Screen        Methamphetamine Screen        Oxycodone  Screen        Tricyclic Antidepressants Screen                  Legend    ^: Historical                          External Prenatal Results       Pregnancy Outside Results - Transcribed From Office Records - See Scanned Records For Details       Test Value Date Time    ABO  A  09/06/23 1505    Rh  Positive  09/06/23 1505    Antibody Screen  Negative  09/06/23 1505    Varicella IgG  <135 index 09/06/23 1505    Rubella  14.40 index 09/06/23 1505    Hgb  11.9 g/dL 02/17/24 1026       12.5 g/dL 01/03/24 1220       12.2 g/dL 09/06/23 1505    Hct  36.6 % 02/17/24 1026       36.7 % 01/03/24 1220       36.4 % 09/06/23 1505    Glucose Fasting GTT       Glucose Tolerance Test 1 hour       Glucose Tolerance Test 3 hour       Gonorrhea (discrete)  Negative  09/06/23 1539    Chlamydia (discrete)  Negative  09/06/23 1539    RPR  Non Reactive  01/03/24 1220       Non Reactive  09/06/23 1505    VDRL       Syphilis Antibody       HBsAg  Negative  09/06/23 1505    Herpes Simplex Virus PCR       Herpes Simplex VIrus Culture       HIV  Non Reactive  09/06/23 1505    Hep C RNA Quant PCR       Hep C Antibody  Non Reactive  09/06/23 1505    AFP       Group B Strep       GBS Susceptibility to Clindamycin       GBS Susceptibility to Erythromycin       Fetal Fibronectin       Genetic Testing, Maternal Blood                 Drug Screening       Test Value Date Time    Urine Drug Screen       Amphetamine Screen       Barbiturate Screen       Benzodiazepine Screen       Methadone Screen       Phencyclidine Screen       Opiates Screen       THC Screen       Cocaine Screen       Propoxyphene Screen       Buprenorphine Screen       Methamphetamine Screen       Oxycodone Screen       Tricyclic Antidepressants Screen                 Legend    ^: Historical                              Family History   Problem Relation Age of Onset    Diabetes Father     Osteoporosis Maternal Aunt     Osteoporosis Maternal Uncle     Breast cancer Neg Hx     Ovarian  cancer Neg Hx     Uterine cancer Neg Hx     Colon cancer Neg Hx     Deep vein thrombosis Neg Hx     Pulmonary embolism Neg Hx     Congenital heart disease Neg Hx        Social History     Tobacco Use    Smoking status: Never    Smokeless tobacco: Never   Vaping Use    Vaping Use: Never used   Substance Use Topics    Alcohol use: Yes     Comment: OCC    Drug use: Never       Patient has no known allergies.      Current Facility-Administered Medications:     acetaminophen (TYLENOL) tablet 650 mg, 650 mg, Oral, Q4H PRN, Allyson Barraza MD    acetaminophen (TYLENOL) tablet 650 mg, 650 mg, Oral, Q4H PRN, Yanique Daniels MD    betamethasone acetate-betamethasone sodium phosphate (CELESTONE SOLUSPAN) injection 12 mg, 12 mg, Intramuscular, Q24H, Allyson Barraza MD, 12 mg at 02/17/24 1042    bisacodyl (DULCOLAX) suppository 10 mg, 10 mg, Rectal, Daily PRN, Allyson Barraza MD    calcium carbonate (TUMS) chewable tablet 500 mg (200 mg elemental), 2 tablet, Oral, Daily PRN, Allyson Barraza MD    cyclobenzaprine (FLEXERIL) tablet 10 mg, 10 mg, Oral, TID, Allyson Barraza MD, 10 mg at 02/17/24 1054    dinoprostone (CERVIDIL) vaginal insert 10 mg, 10 mg, Vaginal, Once, Yanique Daniels MD    diphenhydrAMINE (BENADRYL) capsule 25 mg, 25 mg, Oral, Nightly PRN **OR** diphenhydrAMINE (BENADRYL) injection 25 mg, 25 mg, Intravenous, Nightly PRN, Yanique Daniels MD    docusate sodium (COLACE) capsule 100 mg, 100 mg, Oral, BID PRN, Allyson Barraza MD    hydrALAZINE (APRESOLINE) injection 5-10 mg, 5-10 mg, Intravenous, Q20 Min PRN **OR** labetalol (NORMODYNE,TRANDATE) injection 20-80 mg, 20-80 mg, Intravenous, Q10 Min PRN **OR** NIFEdipine (PROCARDIA) capsule 10-20 mg, 10-20 mg, Oral, Q20 Min PRN, Allyson Barraza MD, 20 mg at 02/17/24 1140    lactated ringers bolus 1,000 mL, 1,000 mL, Intravenous, Once, Yanique Daniels MD    lactated ringers infusion, 125 mL/hr, Intravenous, Continuous, Yanique Daniels,  MD    lidocaine (XYLOCAINE) 1 % injection 0.5 mL, 0.5 mL, Intradermal, Once PRN, Allyson Barraza MD    lidocaine (XYLOCAINE) 1 % injection 0.5 mL, 0.5 mL, Intradermal, Once PRN, Yanique Daniels MD    magnesium sulfate 20 GM/500ML infusion, 2 g/hr, Intravenous, Continuous, Allyson Barraza MD    magnesium sulfate bolus from bag 0.04 g/mL solution 6 g, 6 g, Intravenous, Once, Allyson Barraza MD    ondansetron ODT (ZOFRAN-ODT) disintegrating tablet 8 mg, 8 mg, Oral, Q8H PRN **OR** ondansetron (ZOFRAN) injection 4 mg, 4 mg, Intravenous, Q8H PRN, Allyson Barraza MD    ondansetron ODT (ZOFRAN-ODT) disintegrating tablet 4 mg, 4 mg, Oral, Q6H PRN **OR** ondansetron (ZOFRAN) injection 4 mg, 4 mg, Intravenous, Q6H PRN, Yanique Daniels MD    penicillin G potassium 5 Million Units in sodium chloride 0.9 % 100 mL IVPB-VTB, 5 Million Units, Intravenous, Once **FOLLOWED BY** penicillin G in iso-osmotic dextrose IVPB 3 million units (premix), 3 Million Units, Intravenous, Q4H, Yanique Daniels MD    sodium chloride 0.9 % flush 10 mL, 10 mL, Intravenous, Q12H, Yanique Daniels MD    sodium chloride 0.9 % flush 10 mL, 10 mL, Intravenous, PRN, Yanique Daniels MD    sodium chloride 0.9 % infusion 40 mL, 40 mL, Intravenous, PRNJeremiah Avital O'Brien, MD    terbutaline (BRETHINE) injection 0.25 mg, 0.25 mg, Subcutaneous, PRN, Yanique Daniels MD    Review of Systems  Review of Systems   All other systems reviewed and are negative.      Objective     Vital Signs  Temp:  [98.3 °F (36.8 °C)] 98.3 °F (36.8 °C)  Heart Rate:  [53-84] 61  Resp:  [16] 16  BP: (147-172)/() 172/102    Physical Exam:  Physical Exam  Vitals and nursing note reviewed.   Constitutional:       General: She is not in acute distress.     Appearance: Normal appearance.   HENT:      Head: Normocephalic and atraumatic.   Eyes:      Extraocular Movements: Extraocular movements intact.   Cardiovascular:      Rate and Rhythm: Normal  rate.   Pulmonary:      Effort: Pulmonary effort is normal. No respiratory distress.   Abdominal:      General: There is no distension.      Palpations: Abdomen is soft. There is no mass.      Tenderness: There is no abdominal tenderness.   Musculoskeletal:         General: Normal range of motion.      Cervical back: Normal range of motion.      Right lower leg: No edema.      Left lower leg: No edema.   Skin:     General: Skin is warm and dry.   Neurological:      General: No focal deficit present.      Mental Status: She is alert and oriented to person, place, and time.      Deep Tendon Reflexes: Reflexes normal (2+ patellar DTRs bilaterally).   Psychiatric:         Mood and Affect: Mood normal.         Behavior: Behavior normal.         Thought Content: Thought content normal.         Judgment: Judgment normal.           Hospital problem list:    Pregnancy    Asthma during pregnancy    Severe pre-eclampsia in third trimester      Assessment & Plan     1. Intrauterine pregnancy at 34w1d presents with preeclampsia with severe features  -Discussed findings with patient and recommendation to proceed with IOL.  Patient amenable.  -Mg sulfate ordered  -LFTs slightly elevated but <2x ULN, Cr and plts normal  -s/p procardia 10mg, BP still elevated, 20mg ordered, will continue protocol  -S/p BMZ #1, repeat in 24 hours  -NICU consult ordered  -Cervadil ordered  -GBS unknown, culture ordered, PCN ordered due to  status  -Reviewed procedure with patient.  I discussed the risks including but not limited to bleeding, infection and damage to internal organs.  Understanding of the procedure is voiced.     Yanique Daniels MD  24  11:43 EST      Patient Care Team:  Donna Rivera, APRN as PCP - General (Nurse Practitioner)

## 2024-02-17 NOTE — OBED NOTES
"[facility]  Wilda Evans  : 1995  MRN: [id number]  CSN: [id number]    OB ED Provider Note    Subjective   Chief Complaint   Patient presents with    Back Pain     CLARIBEL- Pt reports neck pain for last week that has progressively gotten worse in the last day. Starts around C1 to T1. +FM, Denies LOF, VB, CTX     Wilda Evans is a 28 y.o. year old  with an Estimated Date of Delivery: 3/29/24 currently at 34w1d presenting with neck pain x 1 week that is worse with movement. It started as waking up after \"sleeping on it wrong\" but has worsened since that time. She reports temporary improvement with heat. She denies HA or epigastric pain, but is noting some mild blurred vision. No CTX, ROM or VB. FM is present.    Prenatal care has been with Dr. Tadeo.  It has been benign.    OB History    Para Term  AB Living   1 0 0 0 0 0   SAB IAB Ectopic Molar Multiple Live Births   0 0 0 0 0 0      # Outcome Date GA Lbr Matthieu/2nd Weight Sex Delivery Anes PTL Lv   1 Current              Past Medical History:   Diagnosis Date    Anxiety     Asthma     Depression     Environmental and seasonal allergies     Polycystic ovary syndrome     Irregular cycles     Past Surgical History:   Procedure Laterality Date    WISDOM TOOTH EXTRACTION         Current Facility-Administered Medications:     betamethasone acetate-betamethasone sodium phosphate (CELESTONE SOLUSPAN) injection 12 mg, 12 mg, Intramuscular, Q24H, Allyson Barraza MD, 12 mg at 24 1042    cyclobenzaprine (FLEXERIL) tablet 10 mg, 10 mg, Oral, TID, Allyson Barraza MD    No Known Allergies  Social History    Tobacco Use      Smoking status: Never      Smokeless tobacco: Never    Review of Systems   Eyes:  Positive for visual disturbance.   Musculoskeletal:  Positive for myalgias and neck pain.   All other systems reviewed and are negative.        Objective   /97   Pulse 53   Temp 98.3 °F (36.8 °C) (Oral)   Ht 162.6 cm (64\")   LMP  (LMP " Unknown)   SpO2 99%   BMI 39.82 kg/m²   General: well developed; well nourished  no acute distress   Abdomen: soft, non-tender; no masses  gravid    FHT's: reactive and category 1      Cervix: was not checked.   Presentation: cephalic   Contractions: every 8 minutes   Chest: Unlabored respirations    CV:  RRR   Ext:   No C/C/E   Back: Neck tenderness is present  bilaterally, with palpable cords in trapezius muscle bilaterally          Prenatal Labs  Lab Results   Component Value Date    HGB 11.9 (L) 02/17/2024    RUBELLAABIGG 14.40 09/06/2023    HEPBSAG Negative 09/06/2023    ABSCRN Negative 09/06/2023    LQE4GYP0 Non Reactive 09/06/2023    HEPCVIRUSABY Non Reactive 09/06/2023     01/03/2024    URINECX Final report 09/06/2023    CHLAMNAA Negative 09/06/2023    NGONORRHON Negative 09/06/2023       Current Labs Reviewed   CBC w/ diff:   Lab Results   Component Value Date    WBC 12.36 (H) 02/17/2024    NEUTRORELPCT 76.8 (H) 02/17/2024    AUTOIGPER 1.1 (H) 02/17/2024    LYMPHORELPCT 13.2 (L) 02/17/2024    MONORELPCT 8.1 02/17/2024    EOSRELPCT 0.6 02/17/2024    BASORELPCT 0.2 02/17/2024    HGB 11.9 (L) 02/17/2024    HCT 36.6 02/17/2024    MCV 85.5 02/17/2024    RDW 13.0 02/17/2024     02/17/2024     UA:    Lab Results   Component Value Date    SQUAMEPIUA 7-12 (A) 02/17/2024    SPECGRAVUR 1.019 02/17/2024    KETONESU Negative 02/17/2024    BLOODU Negative 02/17/2024    LEUKOCYTESUR Trace (A) 02/17/2024    NITRITEU Negative 02/17/2024    RBCUA 0-2 02/17/2024    WBCUA 3-5 (A) 02/17/2024    BACTERIA 3+ (A) 02/17/2024     Urine PCR: 372.2     Assessment   IUP at 34w1d  Preeclampsia with severe features- 2 pressures in severe range, urine PCR elevated  Neck pain- location, PE, alleviating factors point to MSK origin.     Plan   Admit to antepartum for BMZ, serial BP and labs. Severe hypertensive protocol initiated with oral nifedipine. First dose of BMZ given. Treat neck pain with flexeril, local heat.   Jeremiah to be notified and will be assuming management.     Allyson Barraza MD  2/17/2024  10:52 EST

## 2024-02-17 NOTE — PLAN OF CARE
Goal Outcome Evaluation:  Plan of Care Reviewed With: patient        Progress: no change  Outcome Evaluation: 34.1 pre e with sf, induction of labor with cervidil. 6g mag bolus, now currently infusing at 2g/hr. Pt up to bedside commode. FHR reactive. VSS. pt resting comfortably without pain.

## 2024-02-18 LAB
ALBUMIN SERPL-MCNC: 3 G/DL (ref 3.5–5.2)
ALBUMIN/GLOB SERPL: 1 G/DL
ALP SERPL-CCNC: 149 U/L (ref 39–117)
ALT SERPL W P-5'-P-CCNC: 38 U/L (ref 1–33)
ANION GAP SERPL CALCULATED.3IONS-SCNC: 13.2 MMOL/L (ref 5–15)
AST SERPL-CCNC: 27 U/L (ref 1–32)
BACTERIA SPEC AEROBE CULT: NORMAL
BILIRUB SERPL-MCNC: 0.2 MG/DL (ref 0–1.2)
BUN SERPL-MCNC: 14 MG/DL (ref 6–20)
BUN/CREAT SERPL: 20.9 (ref 7–25)
CALCIUM SPEC-SCNC: 7.5 MG/DL (ref 8.6–10.5)
CHLORIDE SERPL-SCNC: 102 MMOL/L (ref 98–107)
CO2 SERPL-SCNC: 18.8 MMOL/L (ref 22–29)
CREAT SERPL-MCNC: 0.67 MG/DL (ref 0.57–1)
DEPRECATED RDW RBC AUTO: 41.2 FL (ref 37–54)
EGFRCR SERPLBLD CKD-EPI 2021: 121.5 ML/MIN/1.73
ERYTHROCYTE [DISTWIDTH] IN BLOOD BY AUTOMATED COUNT: 13.3 % (ref 12.3–15.4)
GLOBULIN UR ELPH-MCNC: 3.1 GM/DL
GLUCOSE SERPL-MCNC: 108 MG/DL (ref 65–99)
HCT VFR BLD AUTO: 33.6 % (ref 34–46.6)
HGB BLD-MCNC: 11.1 G/DL (ref 12–15.9)
MCH RBC QN AUTO: 28.5 PG (ref 26.6–33)
MCHC RBC AUTO-ENTMCNC: 33 G/DL (ref 31.5–35.7)
MCV RBC AUTO: 86.2 FL (ref 79–97)
PLATELET # BLD AUTO: 324 10*3/MM3 (ref 140–450)
PMV BLD AUTO: 10.7 FL (ref 6–12)
POTASSIUM SERPL-SCNC: 4.4 MMOL/L (ref 3.5–5.2)
PROT SERPL-MCNC: 6.1 G/DL (ref 6–8.5)
RBC # BLD AUTO: 3.9 10*6/MM3 (ref 3.77–5.28)
SODIUM SERPL-SCNC: 134 MMOL/L (ref 136–145)
WBC NRBC COR # BLD AUTO: 15.21 10*3/MM3 (ref 3.4–10.8)

## 2024-02-18 PROCEDURE — 25010000002 BETAMETHASONE ACET & SOD PHOS PER 4 MG: Performed by: OBSTETRICS & GYNECOLOGY

## 2024-02-18 PROCEDURE — 10907ZC DRAINAGE OF AMNIOTIC FLUID, THERAPEUTIC FROM PRODUCTS OF CONCEPTION, VIA NATURAL OR ARTIFICIAL OPENING: ICD-10-PCS | Performed by: OBSTETRICS & GYNECOLOGY

## 2024-02-18 PROCEDURE — 3E033VJ INTRODUCTION OF OTHER HORMONE INTO PERIPHERAL VEIN, PERCUTANEOUS APPROACH: ICD-10-PCS | Performed by: OBSTETRICS & GYNECOLOGY

## 2024-02-18 PROCEDURE — 25010000002 MAGNESIUM SULFATE 20 GM/500ML SOLUTION: Performed by: OBSTETRICS & GYNECOLOGY

## 2024-02-18 PROCEDURE — 80053 COMPREHEN METABOLIC PANEL: CPT | Performed by: OBSTETRICS & GYNECOLOGY

## 2024-02-18 PROCEDURE — 25010000002 PENICILLIN G POTASSIUM PER 600000 UNITS: Performed by: STUDENT IN AN ORGANIZED HEALTH CARE EDUCATION/TRAINING PROGRAM

## 2024-02-18 PROCEDURE — 85027 COMPLETE CBC AUTOMATED: CPT | Performed by: OBSTETRICS & GYNECOLOGY

## 2024-02-18 PROCEDURE — 25810000003 LACTATED RINGERS PER 1000 ML: Performed by: STUDENT IN AN ORGANIZED HEALTH CARE EDUCATION/TRAINING PROGRAM

## 2024-02-18 PROCEDURE — C1755 CATHETER, INTRASPINAL: HCPCS | Performed by: STUDENT IN AN ORGANIZED HEALTH CARE EDUCATION/TRAINING PROGRAM

## 2024-02-18 RX ORDER — OXYTOCIN/0.9 % SODIUM CHLORIDE 30/500 ML
2-20 PLASTIC BAG, INJECTION (ML) INTRAVENOUS
Status: DISCONTINUED | OUTPATIENT
Start: 2024-02-18 | End: 2024-02-19

## 2024-02-18 RX ORDER — NIFEDIPINE 60 MG/1
60 TABLET, EXTENDED RELEASE ORAL
Status: DISCONTINUED | OUTPATIENT
Start: 2024-02-18 | End: 2024-02-23 | Stop reason: HOSPADM

## 2024-02-18 RX ORDER — LIDOCAINE HYDROCHLORIDE 15 MG/ML
INJECTION, SOLUTION EPIDURAL; INFILTRATION; INTRACAUDAL; PERINEURAL AS NEEDED
Status: DISCONTINUED | OUTPATIENT
Start: 2024-02-18 | End: 2024-02-19 | Stop reason: SURG

## 2024-02-18 RX ADMIN — PENICILLIN G 3 MILLION UNITS: 3000000 INJECTION, SOLUTION INTRAVENOUS at 21:31

## 2024-02-18 RX ADMIN — BETAMETHASONE SODIUM PHOSPHATE AND BETAMETHASONE ACETATE 12 MG: 3; 3 INJECTION, SUSPENSION INTRA-ARTICULAR; INTRALESIONAL; INTRAMUSCULAR at 10:39

## 2024-02-18 RX ADMIN — LIDOCAINE HYDROCHLORIDE 3 ML: 15 INJECTION, SOLUTION EPIDURAL; INFILTRATION; INTRACAUDAL; PERINEURAL at 15:44

## 2024-02-18 RX ADMIN — SODIUM CHLORIDE 5 MILLION UNITS: 9 INJECTION, SOLUTION INTRAVENOUS at 04:33

## 2024-02-18 RX ADMIN — Medication 25 MCG: at 10:53

## 2024-02-18 RX ADMIN — SODIUM CHLORIDE, POTASSIUM CHLORIDE, SODIUM LACTATE AND CALCIUM CHLORIDE 75 ML/HR: 600; 310; 30; 20 INJECTION, SOLUTION INTRAVENOUS at 00:12

## 2024-02-18 RX ADMIN — Medication 2 MILLI-UNITS/MIN: at 15:27

## 2024-02-18 RX ADMIN — Medication 9 ML/HR: at 15:59

## 2024-02-18 RX ADMIN — PENICILLIN G 3 MILLION UNITS: 3000000 INJECTION, SOLUTION INTRAVENOUS at 17:33

## 2024-02-18 RX ADMIN — LIDOCAINE HYDROCHLORIDE 2 ML: 15 INJECTION, SOLUTION EPIDURAL; INFILTRATION; INTRACAUDAL; PERINEURAL at 15:56

## 2024-02-18 RX ADMIN — SODIUM CHLORIDE, POTASSIUM CHLORIDE, SODIUM LACTATE AND CALCIUM CHLORIDE 75 ML/HR: 600; 310; 30; 20 INJECTION, SOLUTION INTRAVENOUS at 13:43

## 2024-02-18 RX ADMIN — NIFEDIPINE 60 MG: 60 TABLET, FILM COATED, EXTENDED RELEASE ORAL at 11:06

## 2024-02-18 RX ADMIN — PENICILLIN G 3 MILLION UNITS: 3000000 INJECTION, SOLUTION INTRAVENOUS at 12:32

## 2024-02-18 RX ADMIN — Medication 25 MCG: at 06:28

## 2024-02-18 RX ADMIN — Medication 25 MCG: at 02:45

## 2024-02-18 RX ADMIN — ACETAMINOPHEN 325MG 650 MG: 325 TABLET ORAL at 22:35

## 2024-02-18 RX ADMIN — LIDOCAINE HYDROCHLORIDE 3 ML: 15 INJECTION, SOLUTION EPIDURAL; INFILTRATION; INTRACAUDAL; PERINEURAL at 15:48

## 2024-02-18 RX ADMIN — MAGNESIUM SULFATE HEPTAHYDRATE 2 G/HR: 40 INJECTION, SOLUTION INTRAVENOUS at 05:05

## 2024-02-18 RX ADMIN — LIDOCAINE HYDROCHLORIDE 3 ML: 15 INJECTION, SOLUTION EPIDURAL; INFILTRATION; INTRACAUDAL; PERINEURAL at 15:52

## 2024-02-18 RX ADMIN — MAGNESIUM SULFATE HEPTAHYDRATE 2 G/HR: 40 INJECTION, SOLUTION INTRAVENOUS at 15:05

## 2024-02-18 RX ADMIN — EPHEDRINE SULFATE 5 MG: 50 INJECTION INTRAVENOUS at 15:57

## 2024-02-18 RX ADMIN — PENICILLIN G 3 MILLION UNITS: 3000000 INJECTION, SOLUTION INTRAVENOUS at 08:40

## 2024-02-18 NOTE — ANESTHESIA PROCEDURE NOTES
Labor Epidural    Pre-sedation assessment completed: 2/18/2024 3:36 PM    Patient reassessed immediately prior to procedure    Patient location during procedure: OB  Start Time: 2/18/2024 3:36 PM  Stop Time: 2/18/2024 4:00 PM  Indication:at surgeon's request  Performed By  Anesthesiologist: Lewis Antunez MD  Preanesthetic Checklist  Completed: patient identified, IV checked, site marked, risks and benefits discussed, surgical consent, monitors and equipment checked, pre-op evaluation and timeout performed  Prep:  Pt Position:sitting  Sterile Tech:cap, gloves, gown, mask and sterile barrier  Prep:chlorhexidine gluconate and isopropyl alcohol  Monitoring:blood pressure monitoring and EKG  Epidural Block Procedure:  Approach:midline  Guidance:landmark technique and palpation technique  Location:L4-L5  Needle Type:Tuohy  Needle Gauge:17  Loss of Resistance Medium: saline  Paresthesia: none  Aspiration:negative  Test Dose:negative  Number of Attempts: 1  Post Assessment:  Dressing:occlusive dressing applied and secured with tape  Pt Tolerance:patient tolerated the procedure well with no apparent complications  Complications:no

## 2024-02-18 NOTE — ANESTHESIA PREPROCEDURE EVALUATION
Anesthesia Evaluation     Patient summary reviewed and Nursing notes reviewed                Airway   Mallampati: II  TM distance: >3 FB  Neck ROM: full  no difficulty expected  Dental - normal exam     Pulmonary - normal exam   (+) asthma,  Cardiovascular - normal exam    (+) hypertension      Neuro/Psych- negative ROS  GI/Hepatic/Renal/Endo    (+) morbid obesity    Musculoskeletal (-) negative ROS    Abdominal    Substance History - negative use     OB/GYN    (+) Pregnant, Preeclampsia, pregnancy induced hypertension        Other        ROS/Med Hx Other: Severe pre eclampsia              Anesthesia Plan    ASA 4     epidural     (34w2d    )    Anesthetic plan, risks, benefits, and alternatives have been provided, discussed and informed consent has been obtained with: patient.    CODE STATUS:    Level Of Support Discussed With: Patient  Code Status (Patient has no pulse and is not breathing): CPR (Attempt to Resuscitate)  Medical Interventions (Patient has pulse or is breathing): Full Support

## 2024-02-18 NOTE — PLAN OF CARE
Goal Outcome Evaluation:  Plan of Care Reviewed With: patient, spouse        Progress: improving  Outcome Evaluation: IOL for pre-eclampsia. Procardia 60mgXL started. Epidural for pain management, Cook ripening balloon placed at 1442.

## 2024-02-18 NOTE — NURSING NOTE
Dr Daniels on unit. New orders. Cytotec 25mcg q4 vaginally up[ to 5 doses once cervidil is removed.  Start PCN at 0400. Repeated and verified

## 2024-02-18 NOTE — PLAN OF CARE
Problem: Adult Inpatient Plan of Care  Goal: Plan of Care Review  Outcome: Ongoing, Progressing  Flowsheets (Taken 2/18/2024 0600)  Progress: improving  Plan of Care Reviewed With: patient  Outcome Evaluation: IOL with cytotec, recieving mag for pre-e vss  Goal: Patient-Specific Goal (Individualized)  Outcome: Ongoing, Progressing  Goal: Absence of Hospital-Acquired Illness or Injury  Outcome: Ongoing, Progressing  Goal: Optimal Comfort and Wellbeing  Outcome: Ongoing, Progressing  Intervention: Provide Person-Centered Care  Recent Flowsheet Documentation  Taken 2/18/2024 0230 by Sneha Abraham, RN  Trust Relationship/Rapport:   care explained   choices provided   emotional support provided   questions encouraged   empathic listening provided   questions answered   reassurance provided   thoughts/feelings acknowledged  Goal: Readiness for Transition of Care  Outcome: Ongoing, Progressing     Problem: Hypertensive Disorders in Pregnancy  Goal: Maternal-Fetal Stabilization  Outcome: Ongoing, Progressing     Problem: Maternal-Fetal Wellbeing  Goal: Optimal Maternal-Fetal Wellbeing  Outcome: Ongoing, Progressing     Problem: Bleeding (Labor)  Goal: Hemostasis  Outcome: Ongoing, Progressing     Problem: Change in Fetal Wellbeing (Labor)  Goal: Stable Fetal Wellbeing  Outcome: Ongoing, Progressing     Problem: Delayed Labor Progression (Labor)  Goal: Effective Progression to Delivery  Outcome: Ongoing, Progressing     Problem: Infection (Labor)  Goal: Absence of Infection Signs and Symptoms  Outcome: Ongoing, Progressing     Problem: Labor Pain (Labor)  Goal: Acceptable Pain Control  Outcome: Ongoing, Progressing     Problem: Uterine Tachysystole (Labor)  Goal: Normal Uterine Contraction Pattern  Outcome: Ongoing, Progressing     Problem: Pain Acute  Goal: Acceptable Pain Control and Functional Ability  Outcome: Ongoing, Progressing     Problem: Fall Injury Risk  Goal: Absence of Fall and Fall-Related Injury  Outcome:  Ongoing, Progressing   Goal Outcome Evaluation:  Plan of Care Reviewed With: patient        Progress: improving  Outcome Evaluation: IOL with cytotec, recieving mag for pre-e vss

## 2024-02-18 NOTE — PROGRESS NOTES
"Patient notes she had a comfortable night and slept.  She received a Cervidil that came out at about 1 AM.  She has received 2 doses of Cytotec so far.  Last check at about 630 the cervix was 0 to 1 cm.  Next check will be at about 1030.  Patient is sitting up in bed eating breakfast.  No headache or visual changes.    /98 (BP Location: Left arm, Patient Position: Lying)   Pulse 91   Temp 97.9 °F (36.6 °C) (Oral)   Resp 16   Ht 162.6 cm (64\")   LMP  (LMP Unknown)   SpO2 100%   Breastfeeding Yes   BMI 39.82 kg/m²   Cervix exam delayed until 1031 time for next check  Results from last 7 days   Lab Units 24  1026   SODIUM mmol/L 136   POTASSIUM mmol/L 4.0   CHLORIDE mmol/L 102   CO2 mmol/L 20.3*   BUN mg/dL 11   CREATININE mg/dL 0.63   CALCIUM mg/dL 8.9   BILIRUBIN mg/dL 0.3   ALK PHOS U/L 178*   ALT (SGPT) U/L 43*   AST (SGOT) U/L 39*   GLUCOSE mg/dL 87     Results from last 7 days   Lab Units 24  1026   WBC 10*3/mm3 12.36*   HEMOGLOBIN g/dL 11.9*   HEMATOCRIT % 36.6   PLATELETS 10*3/mm3 278         [id number]  1 para 0 at 34-2/7 weeks with severe preeclampsia  - Continue magnesium  -Continue cervical ripening with Cytotec and recheck at 1030  -Recheck liver enzymes  -Second betamethasone today  -Penicillin for unknown GBS    "

## 2024-02-18 NOTE — PROGRESS NOTES
Patient has received 3 doses of Cytotec.    Cervix is 1 to 2 cm 70% and -2 station.  Cervical ripening balloon is placed with speculum exam.  80 cc in each balloon.    Plan-cervical ripening balloon with Pitocin.

## 2024-02-19 LAB
ATMOSPHERIC PRESS: 752.3 MMHG
BACTERIA SPEC AEROBE CULT: NORMAL
BASE EXCESS BLDCOA CALC-SCNC: -2.1 MMOL/L (ref -2–2)
BDY SITE: ABNORMAL
CO2 BLDA-SCNC: 28.6 MMOL/L (ref 23–27)
DEVICE COMMENT: ABNORMAL
EXPIRATION DATE: NORMAL
HCO3 BLDCOA-SCNC: 26.7 MMOL/L (ref 22–28)
INHALED O2 CONCENTRATION: 21 %
Lab: NORMAL
MODALITY: ABNORMAL
PCO2 BLDCOA: 60.7 MMHG (ref 43–63)
PH BLDCOA: 7.25 PH UNITS (ref 7.18–7.34)
PO2 BLDCOA: 18.6 MMHG (ref 12–26)
PROT UR STRIP-MCNC: NEGATIVE MG/DL
SAO2 % BLDCOA: 21.1 %

## 2024-02-19 PROCEDURE — 81002 URINALYSIS NONAUTO W/O SCOPE: CPT | Performed by: OBSTETRICS & GYNECOLOGY

## 2024-02-19 PROCEDURE — 59400 OBSTETRICAL CARE: CPT | Performed by: OBSTETRICS & GYNECOLOGY

## 2024-02-19 PROCEDURE — 88307 TISSUE EXAM BY PATHOLOGIST: CPT

## 2024-02-19 PROCEDURE — 0KQM0ZZ REPAIR PERINEUM MUSCLE, OPEN APPROACH: ICD-10-PCS | Performed by: OBSTETRICS & GYNECOLOGY

## 2024-02-19 PROCEDURE — 25810000003 LACTATED RINGERS PER 1000 ML: Performed by: STUDENT IN AN ORGANIZED HEALTH CARE EDUCATION/TRAINING PROGRAM

## 2024-02-19 PROCEDURE — 0UQMXZZ REPAIR VULVA, EXTERNAL APPROACH: ICD-10-PCS | Performed by: OBSTETRICS & GYNECOLOGY

## 2024-02-19 PROCEDURE — 25010000002 PENICILLIN G POTASSIUM PER 600000 UNITS: Performed by: STUDENT IN AN ORGANIZED HEALTH CARE EDUCATION/TRAINING PROGRAM

## 2024-02-19 PROCEDURE — 25010000002 MAGNESIUM SULFATE 20 GM/500ML SOLUTION: Performed by: OBSTETRICS & GYNECOLOGY

## 2024-02-19 PROCEDURE — 82803 BLOOD GASES ANY COMBINATION: CPT | Performed by: OBSTETRICS & GYNECOLOGY

## 2024-02-19 RX ORDER — ONDANSETRON 2 MG/ML
4 INJECTION INTRAMUSCULAR; INTRAVENOUS EVERY 6 HOURS PRN
Status: DISCONTINUED | OUTPATIENT
Start: 2024-02-19 | End: 2024-02-19

## 2024-02-19 RX ORDER — DIPHENHYDRAMINE HYDROCHLORIDE 50 MG/ML
25 INJECTION INTRAMUSCULAR; INTRAVENOUS NIGHTLY PRN
Status: DISCONTINUED | OUTPATIENT
Start: 2024-02-19 | End: 2024-02-19

## 2024-02-19 RX ORDER — BISACODYL 10 MG
10 SUPPOSITORY, RECTAL RECTAL DAILY PRN
Status: DISCONTINUED | OUTPATIENT
Start: 2024-02-20 | End: 2024-02-23 | Stop reason: HOSPADM

## 2024-02-19 RX ORDER — MISOPROSTOL 200 UG/1
800 TABLET ORAL ONCE AS NEEDED
Status: DISCONTINUED | OUTPATIENT
Start: 2024-02-19 | End: 2024-02-19

## 2024-02-19 RX ORDER — METHYLERGONOVINE MALEATE 0.2 MG/ML
200 INJECTION INTRAVENOUS ONCE AS NEEDED
Status: DISCONTINUED | OUTPATIENT
Start: 2024-02-19 | End: 2024-02-19

## 2024-02-19 RX ORDER — IBUPROFEN 600 MG/1
600 TABLET ORAL EVERY 6 HOURS PRN
Status: DISCONTINUED | OUTPATIENT
Start: 2024-02-19 | End: 2024-02-23 | Stop reason: HOSPADM

## 2024-02-19 RX ORDER — DOCUSATE SODIUM 100 MG/1
100 CAPSULE, LIQUID FILLED ORAL 2 TIMES DAILY
Status: DISCONTINUED | OUTPATIENT
Start: 2024-02-19 | End: 2024-02-23 | Stop reason: HOSPADM

## 2024-02-19 RX ORDER — OXYTOCIN/0.9 % SODIUM CHLORIDE 30/500 ML
999 PLASTIC BAG, INJECTION (ML) INTRAVENOUS ONCE
Status: COMPLETED | OUTPATIENT
Start: 2024-02-19 | End: 2024-02-19

## 2024-02-19 RX ORDER — IBUPROFEN 600 MG/1
600 TABLET ORAL EVERY 6 HOURS PRN
Status: DISCONTINUED | OUTPATIENT
Start: 2024-02-19 | End: 2024-02-19

## 2024-02-19 RX ORDER — PHYTONADIONE 1 MG/.5ML
INJECTION, EMULSION INTRAMUSCULAR; INTRAVENOUS; SUBCUTANEOUS
Status: ACTIVE
Start: 2024-02-19 | End: 2024-02-19

## 2024-02-19 RX ORDER — ONDANSETRON 4 MG/1
4 TABLET, ORALLY DISINTEGRATING ORAL EVERY 6 HOURS PRN
Status: DISCONTINUED | OUTPATIENT
Start: 2024-02-19 | End: 2024-02-19

## 2024-02-19 RX ORDER — OXYTOCIN/0.9 % SODIUM CHLORIDE 30/500 ML
125 PLASTIC BAG, INJECTION (ML) INTRAVENOUS ONCE AS NEEDED
Status: DISCONTINUED | OUTPATIENT
Start: 2024-02-19 | End: 2024-02-23 | Stop reason: HOSPADM

## 2024-02-19 RX ORDER — HYDROCODONE BITARTRATE AND ACETAMINOPHEN 5; 325 MG/1; MG/1
1 TABLET ORAL EVERY 4 HOURS PRN
Status: DISCONTINUED | OUTPATIENT
Start: 2024-02-19 | End: 2024-02-23 | Stop reason: HOSPADM

## 2024-02-19 RX ORDER — ACETAMINOPHEN 325 MG/1
650 TABLET ORAL EVERY 6 HOURS PRN
Status: DISCONTINUED | OUTPATIENT
Start: 2024-02-19 | End: 2024-02-23 | Stop reason: HOSPADM

## 2024-02-19 RX ORDER — OXYTOCIN/0.9 % SODIUM CHLORIDE 30/500 ML
125 PLASTIC BAG, INJECTION (ML) INTRAVENOUS ONCE AS NEEDED
Status: COMPLETED | OUTPATIENT
Start: 2024-02-19 | End: 2024-02-19

## 2024-02-19 RX ORDER — OXYTOCIN/0.9 % SODIUM CHLORIDE 30/500 ML
250 PLASTIC BAG, INJECTION (ML) INTRAVENOUS CONTINUOUS
Status: DISPENSED | OUTPATIENT
Start: 2024-02-19 | End: 2024-02-19

## 2024-02-19 RX ORDER — FLUOXETINE HYDROCHLORIDE 20 MG/1
40 CAPSULE ORAL NIGHTLY
Status: DISCONTINUED | OUTPATIENT
Start: 2024-02-19 | End: 2024-02-23 | Stop reason: HOSPADM

## 2024-02-19 RX ORDER — DIPHENHYDRAMINE HCL 25 MG
25 CAPSULE ORAL NIGHTLY PRN
Status: DISCONTINUED | OUTPATIENT
Start: 2024-02-19 | End: 2024-02-23 | Stop reason: HOSPADM

## 2024-02-19 RX ORDER — DIPHENHYDRAMINE HCL 25 MG
25 CAPSULE ORAL NIGHTLY PRN
Status: DISCONTINUED | OUTPATIENT
Start: 2024-02-19 | End: 2024-02-19

## 2024-02-19 RX ORDER — ALBUTEROL SULFATE 90 UG/1
2 AEROSOL, METERED RESPIRATORY (INHALATION) EVERY 6 HOURS PRN
Status: DISCONTINUED | OUTPATIENT
Start: 2024-02-19 | End: 2024-02-23 | Stop reason: HOSPADM

## 2024-02-19 RX ORDER — HYDROCORTISONE 25 MG/G
CREAM TOPICAL AS NEEDED
Status: DISCONTINUED | OUTPATIENT
Start: 2024-02-19 | End: 2024-02-23 | Stop reason: HOSPADM

## 2024-02-19 RX ORDER — FLUOXETINE HYDROCHLORIDE 20 MG/1
40 CAPSULE ORAL DAILY
Status: DISCONTINUED | OUTPATIENT
Start: 2024-02-19 | End: 2024-02-19

## 2024-02-19 RX ORDER — SODIUM CHLORIDE, SODIUM LACTATE, POTASSIUM CHLORIDE, CALCIUM CHLORIDE 600; 310; 30; 20 MG/100ML; MG/100ML; MG/100ML; MG/100ML
75 INJECTION, SOLUTION INTRAVENOUS CONTINUOUS
Status: DISCONTINUED | OUTPATIENT
Start: 2024-02-19 | End: 2024-02-23 | Stop reason: HOSPADM

## 2024-02-19 RX ORDER — MAGNESIUM SULFATE HEPTAHYDRATE 40 MG/ML
2 INJECTION, SOLUTION INTRAVENOUS CONTINUOUS
Status: DISPENSED | OUTPATIENT
Start: 2024-02-19 | End: 2024-02-20

## 2024-02-19 RX ORDER — CARBOPROST TROMETHAMINE 250 UG/ML
250 INJECTION, SOLUTION INTRAMUSCULAR
Status: DISCONTINUED | OUTPATIENT
Start: 2024-02-19 | End: 2024-02-19

## 2024-02-19 RX ORDER — MONTELUKAST SODIUM 10 MG/1
10 TABLET ORAL NIGHTLY
Status: DISCONTINUED | OUTPATIENT
Start: 2024-02-19 | End: 2024-02-23 | Stop reason: HOSPADM

## 2024-02-19 RX ORDER — ONDANSETRON 2 MG/ML
4 INJECTION INTRAMUSCULAR; INTRAVENOUS EVERY 6 HOURS PRN
Status: DISCONTINUED | OUTPATIENT
Start: 2024-02-19 | End: 2024-02-23 | Stop reason: HOSPADM

## 2024-02-19 RX ORDER — HYDROCODONE BITARTRATE AND ACETAMINOPHEN 10; 325 MG/1; MG/1
1 TABLET ORAL EVERY 4 HOURS PRN
Status: DISCONTINUED | OUTPATIENT
Start: 2024-02-19 | End: 2024-02-23 | Stop reason: HOSPADM

## 2024-02-19 RX ORDER — ERYTHROMYCIN 5 MG/G
OINTMENT OPHTHALMIC
Status: ACTIVE
Start: 2024-02-19 | End: 2024-02-19

## 2024-02-19 RX ADMIN — PENICILLIN G 3 MILLION UNITS: 3000000 INJECTION, SOLUTION INTRAVENOUS at 01:34

## 2024-02-19 RX ADMIN — FLUOXETINE HYDROCHLORIDE 40 MG: 20 CAPSULE ORAL at 21:17

## 2024-02-19 RX ADMIN — DOCUSATE SODIUM 100 MG: 100 CAPSULE, LIQUID FILLED ORAL at 08:44

## 2024-02-19 RX ADMIN — HYDROCODONE BITARTRATE AND ACETAMINOPHEN 1 TABLET: 5; 325 TABLET ORAL at 10:12

## 2024-02-19 RX ADMIN — MONTELUKAST SODIUM 10 MG: 10 TABLET, FILM COATED ORAL at 21:17

## 2024-02-19 RX ADMIN — MAGNESIUM SULFATE HEPTAHYDRATE 2 G/HR: 40 INJECTION, SOLUTION INTRAVENOUS at 19:31

## 2024-02-19 RX ADMIN — IBUPROFEN 600 MG: 600 TABLET, FILM COATED ORAL at 19:28

## 2024-02-19 RX ADMIN — Medication 125 ML/HR: at 06:38

## 2024-02-19 RX ADMIN — HYDROCODONE BITARTRATE AND ACETAMINOPHEN 1 TABLET: 5; 325 TABLET ORAL at 19:28

## 2024-02-19 RX ADMIN — NIFEDIPINE 60 MG: 60 TABLET, FILM COATED, EXTENDED RELEASE ORAL at 08:45

## 2024-02-19 RX ADMIN — MAGNESIUM SULFATE HEPTAHYDRATE 2 G/HR: 40 INJECTION, SOLUTION INTRAVENOUS at 00:09

## 2024-02-19 RX ADMIN — MAGNESIUM SULFATE HEPTAHYDRATE 2 G/HR: 40 INJECTION, SOLUTION INTRAVENOUS at 10:13

## 2024-02-19 RX ADMIN — IBUPROFEN 600 MG: 600 TABLET, FILM COATED ORAL at 07:34

## 2024-02-19 RX ADMIN — DOCUSATE SODIUM 100 MG: 100 CAPSULE, LIQUID FILLED ORAL at 21:17

## 2024-02-19 RX ADMIN — Medication 999 ML/HR: at 05:21

## 2024-02-19 RX ADMIN — SODIUM CHLORIDE, POTASSIUM CHLORIDE, SODIUM LACTATE AND CALCIUM CHLORIDE 75 ML/HR: 600; 310; 30; 20 INJECTION, SOLUTION INTRAVENOUS at 19:31

## 2024-02-19 RX ADMIN — SODIUM CHLORIDE, POTASSIUM CHLORIDE, SODIUM LACTATE AND CALCIUM CHLORIDE 75 ML/HR: 600; 310; 30; 20 INJECTION, SOLUTION INTRAVENOUS at 02:11

## 2024-02-19 NOTE — ANESTHESIA POSTPROCEDURE EVALUATION
Patient: Wilda Evans    Procedure Summary       Date: 02/18/24 Room / Location:     Anesthesia Start: 1536 Anesthesia Stop: 02/19/24 0517    Procedure: LABOR ANALGESIA Diagnosis:     Scheduled Providers:  Provider: Alen Olsen MD    Anesthesia Type: epidural ASA Status: 4            Anesthesia Type: epidural    Vitals  Vitals Value Taken Time   /85 02/19/24 0601   Temp 36.6 °C (97.9 °F) 02/19/24 0554   Pulse 69 02/19/24 0605   Resp 18 02/19/24 0545   SpO2 100 % 02/19/24 0605   Vitals shown include unfiled device data.        Post Anesthesia Care and Evaluation      Comments: Anesthesia present throughout labor and delivery

## 2024-02-19 NOTE — PLAN OF CARE
Goal Outcome Evaluation:  Plan of Care Reviewed With: patient        Progress: improving  Outcome Evaluation: VSS. Pt denies pain. Scant bleeding noted. Pt continues on IV magnesium as ordered, to be d/c'd at 0517. Lactation met with pt today, pt pumping every 3 hours. Baby in NICU. Pt resting between care.

## 2024-02-19 NOTE — PLAN OF CARE
Problem: Adult Inpatient Plan of Care  Goal: Plan of Care Review  Outcome: Ongoing, Progressing  Flowsheets (Taken 2/19/2024 0652)  Progress: improving  Plan of Care Reviewed With: patient  Outcome Evaluation: pt had vaginal delivery at 0517 with 2nd laceration. . Pt will be on magnesium for 24hrs and will continue with procardia. pt vitals have been stable throughout the night. pt is getting rest at this time with no new complaints  Goal: Patient-Specific Goal (Individualized)  Outcome: Ongoing, Progressing  Goal: Absence of Hospital-Acquired Illness or Injury  Outcome: Ongoing, Progressing  Intervention: Identify and Manage Fall Risk  Recent Flowsheet Documentation  Taken 2/19/2024 0630 by Roselyn Chicas RN  Safety Promotion/Fall Prevention: safety round/check completed  Taken 2/19/2024 0530 by Roselyn Chicas RN  Safety Promotion/Fall Prevention: safety round/check completed  Taken 2/19/2024 0430 by Roselyn Chicas RN  Safety Promotion/Fall Prevention: safety round/check completed  Taken 2/19/2024 0330 by Roselyn Chicas RN  Safety Promotion/Fall Prevention: safety round/check completed  Taken 2/19/2024 0230 by Roselyn Chicas RN  Safety Promotion/Fall Prevention: safety round/check completed  Taken 2/19/2024 0130 by Roselyn Chicas RN  Safety Promotion/Fall Prevention: safety round/check completed  Taken 2/19/2024 0030 by Roselyn Chicas RN  Safety Promotion/Fall Prevention: safety round/check completed  Taken 2/18/2024 2330 by Roselyn Chicas RN  Safety Promotion/Fall Prevention: safety round/check completed  Taken 2/18/2024 2230 by Roselyn Chicas RN  Safety Promotion/Fall Prevention: safety round/check completed  Taken 2/18/2024 2130 by Roselyn Chicas RN  Safety Promotion/Fall Prevention: safety round/check completed  Taken 2/18/2024 1935 by Roselyn Chicas RN  Safety Promotion/Fall Prevention: safety round/check completed  Intervention: Prevent and Manage VTE (Venous Thromboembolism)  Risk  Recent Flowsheet Documentation  Taken 2/19/2024 0630 by Roselyn Chicas RN  VTE Prevention/Management:   bilateral   sequential compression devices on  Range of Motion: active ROM (range of motion) encouraged  Taken 2/19/2024 0545 by Roselyn Chicas RN  Activity Management: bedrest  Taken 2/18/2024 1935 by Roselyn Chicas RN  Activity Management: bedrest  VTE Prevention/Management:   bilateral   sequential compression devices on  Range of Motion: active ROM (range of motion) encouraged  Goal: Optimal Comfort and Wellbeing  Outcome: Ongoing, Progressing  Intervention: Provide Person-Centered Care  Recent Flowsheet Documentation  Taken 2/19/2024 0630 by Roselyn Chicas RN  Trust Relationship/Rapport:   care explained   choices provided   emotional support provided   empathic listening provided   questions answered   questions encouraged   reassurance provided   thoughts/feelings acknowledged  Taken 2/18/2024 1935 by Roselyn Chicas RN  Trust Relationship/Rapport:   care explained   choices provided   emotional support provided   empathic listening provided   questions answered   questions encouraged   reassurance provided   thoughts/feelings acknowledged  Goal: Readiness for Transition of Care  Outcome: Ongoing, Progressing     Problem: Hypertensive Disorders in Pregnancy  Goal: Maternal-Fetal Stabilization  Outcome: Ongoing, Progressing  Intervention: Monitor and Manage Symptom Progression  Recent Flowsheet Documentation  Taken 2/18/2024 1935 by Roselyn Chicas RN  Medication Review/Management: medications reviewed     Problem: Maternal-Fetal Wellbeing  Goal: Optimal Maternal-Fetal Wellbeing  Outcome: Ongoing, Progressing  Intervention: Support Psychosocial Response to Complications During Pregnancy  Recent Flowsheet Documentation  Taken 2/19/2024 0630 by Roselyn Chicas RN  Family/Support System Care:   self-care encouraged   support provided  Taken 2/18/2024 1935 by Roselyn Chicas RN  Family/Support  System Care:   self-care encouraged   support provided     Problem: Bleeding (Labor)  Goal: Hemostasis  Outcome: Ongoing, Progressing     Problem: Change in Fetal Wellbeing (Labor)  Goal: Stable Fetal Wellbeing  Outcome: Ongoing, Progressing     Problem: Delayed Labor Progression (Labor)  Goal: Effective Progression to Delivery  Outcome: Ongoing, Progressing     Problem: Infection (Labor)  Goal: Absence of Infection Signs and Symptoms  Outcome: Ongoing, Progressing     Problem: Labor Pain (Labor)  Goal: Acceptable Pain Control  Outcome: Ongoing, Progressing     Problem: Uterine Tachysystole (Labor)  Goal: Normal Uterine Contraction Pattern  Outcome: Ongoing, Progressing     Problem: Pain Acute  Goal: Acceptable Pain Control and Functional Ability  Outcome: Ongoing, Progressing  Intervention: Prevent or Manage Pain  Recent Flowsheet Documentation  Taken 2/18/2024 1935 by Roselyn Chicas RN  Medication Review/Management: medications reviewed  Intervention: Optimize Psychosocial Wellbeing  Recent Flowsheet Documentation  Taken 2/19/2024 0630 by Roselyn Chicas RN  Diversional Activities: smartphone  Taken 2/18/2024 1935 by Roselyn Chicas RN  Diversional Activities: smartphone     Problem: Fall Injury Risk  Goal: Absence of Fall and Fall-Related Injury  Outcome: Ongoing, Progressing  Intervention: Identify and Manage Contributors  Recent Flowsheet Documentation  Taken 2/18/2024 1935 by Roselyn Chicas RN  Medication Review/Management: medications reviewed  Intervention: Promote Injury-Free Environment  Recent Flowsheet Documentation  Taken 2/19/2024 0630 by Roselyn Chicas RN  Safety Promotion/Fall Prevention: safety round/check completed  Taken 2/19/2024 0530 by Roselyn Chicas RN  Safety Promotion/Fall Prevention: safety round/check completed  Taken 2/19/2024 0430 by Roselyn Chicas RN  Safety Promotion/Fall Prevention: safety round/check completed  Taken 2/19/2024 0330 by Roselyn Chicas RN  Safety  Promotion/Fall Prevention: safety round/check completed  Taken 2/19/2024 0230 by Roselyn Chicas RN  Safety Promotion/Fall Prevention: safety round/check completed  Taken 2/19/2024 0130 by Roselyn Chicas RN  Safety Promotion/Fall Prevention: safety round/check completed  Taken 2/19/2024 0030 by Roselyn Chicas RN  Safety Promotion/Fall Prevention: safety round/check completed  Taken 2/18/2024 2330 by Roselyn Chicas RN  Safety Promotion/Fall Prevention: safety round/check completed  Taken 2/18/2024 2230 by Roselyn Chicas RN  Safety Promotion/Fall Prevention: safety round/check completed  Taken 2/18/2024 2130 by Roselyn Chicas RN  Safety Promotion/Fall Prevention: safety round/check completed  Taken 2/18/2024 1935 by Roselyn Chicas RN  Safety Promotion/Fall Prevention: safety round/check completed     Problem: Skin Injury Risk Increased  Goal: Skin Health and Integrity  Outcome: Ongoing, Progressing   Goal Outcome Evaluation:  Plan of Care Reviewed With: patient        Progress: improving  Outcome Evaluation: pt had vaginal delivery at 0517 with 2nd laceration. . Pt will be on magnesium for 24hrs and will continue with procardia. pt vitals have been stable throughout the night. pt is getting rest at this time with no new complaints

## 2024-02-19 NOTE — L&D DELIVERY NOTE
[facility]   Vaginal Delivery Note    Patient Name: Wilda Evans  : 1995  MRN: [id number]    Date of Delivery: 2024     Diagnosis     Pre & Post-Delivery:  Intrauterine pregnancy at 34w3d  Labor status: Induced Onset of Labor     Pregnancy    Asthma during pregnancy    Severe pre-eclampsia in third trimester    Preeclampsia, severe, third trimester             Problem List    Transfer to Postpartum     Review the Delivery Report for details.     Delivery     Delivery: Vaginal, Spontaneous     YOB: 2025    Time of Birth:  Gestational Age 5:17 AM   34w3d     Anesthesia: Epidural     Delivering clinician: Calixto Graham    Forceps?   No   Vacuum? No    Shoulder dystocia present: No        Delivery narrative: The patient is a 28-year-old  1 para 0 at 34-3/7 weeks who was induced for severe preeclampsia.  Patient is on magnesium.  She received Cervidil cervical ripening.  Cervix was still 0 to 1 cm so Cytotec was initiated.  Patient received 3 doses.  After the third dose the patient was 1 to 2 cm and a cervical ripening balloon was placed and Pitocin started.  The balloon was removed at the 6-hour point and the patient was 5 cm.  Artificial rupture membranes was done.  Patient was complete about 9 hours later.  Pushing was started and when the baby was  the patient was prepped and draped for delivery.   nurse practitioner was called for delivery due to 34 weeks gestation.  Fetal head delivered and the nares and mouth were bulb suctioned.  There was no nuchal cord.  Shoulders and body delivered without difficulty.  Baby was placed on mom's abdomen and after 30 seconds the cord was clamped and cut.  Arterial cord gas and cord blood was collected.  Placenta delivered and was noted to be complete.  Pitocin was started per protocol.  Uterus was massaged for 30 seconds.  Second-degree vaginal and perineal laceration was repaired in standard fashion with 3-0 Vicryl.  There  "was also a small right periurethral laceration which was repaired with 4-0 Vicryl on an SH needle.  There was a small area of bleeding at the introitus that was made hemostatic with 2 figure-of-eight sutures of 4-0 Vicryl.  Vaginal exam was done and some clots were removed from the lower segment.  Uterus now feels firm.  Mom and baby are recovering well.  Baby is going to go to NICU due to premature age but is doing well.      Infant     Findings: male  infant     Infant observations: Weight: 2395 g (5 lb 4.5 oz)   Length: 18.5  in  Observations/Comments:  LR 17 Infant scale #2      Apgars: 8  @ 1 minute /    9  @ 5 minutes   Infant Name: Eduard     Placenta & Cord         Placenta delivered  Expressed  at   2/19/2024  5:21 AM     Cord:   present.   Nuchal Cord?  no   Cord blood obtained: Yes    Cord gases obtained:  Yes    Cord gas results: Venous:  No results found for: \"PHCVEN\", \"BECVEN\"    Arterial:    pH, Cord Arterial   Date Value Ref Range Status   02/19/2024 7.25 7.18 - 7.34 pH Units Final     Comment:     Serial Number: [id number]:  [id number]     Base Exc, Cord Arterial   Date Value Ref Range Status   02/19/2024 -2.1 (L) -2.0 - 2.0 mmol/L Final        Repair     Episiotomy: None     No    Lacerations: Yes  Laceration Information  Laceration Repaired?   Perineal: 2nd  Yes    Periurethral:       Labial:       Sulcus:       Vaginal:       Cervical:         Suture used for repair: 3-0 Vicryl and 4-0 Vicryl     Estimated Blood Loss:  150 cc     Quantitative Blood Loss:  See epic record        Complications     none    Disposition     Mother to Mother Baby/Postpartum  in stable condition currently.  Baby to remains with mom  in stable condition currently.    Calixto Graham MD  02/19/24  05:43 EST        "

## 2024-02-19 NOTE — LACTATION NOTE
PT1, 34.3 weeks baby- admitted to NICU. Mom came up from L&D. HGP initiated at this time with instructions of use, cleaning the parts and milk storage. Encouraged PT to pump every 3h for 15 min.on each breast if baby is not BF. Educated on the importance of stimulation for adequate milk supply. PT denies any questions. She has PBP. Encouraged her to call if needing further help.

## 2024-02-19 NOTE — PROGRESS NOTES
"Patient is comfortable with epidural.  Patient had 1 significantly elevated blood pressure but it was thought to be due to pain right before epidural.  Blood pressures since have been in nonsevere range.    /88   Pulse 86   Temp 98.6 °F (37 °C) (Oral)   Resp 16   Ht 162.6 cm (64\")   LMP  (LMP Unknown)   SpO2 97%   Breastfeeding Yes   BMI 39.82 kg/m²   CRB deflated.  Cervix is 5 cm 80% and -2 station  Artificial rupture membranes reveals clear fluid  IUPC catheter is placed  Fetal heart rate tracing is category 1.  Some episodes of decreased variability but the patient is on magnesium.  Pitocin is on 10    Assessment-induction of labor for severe preeclampsia  Good cervical change with cervical ripening balloon  Continue Pitocin  Patient is status post betamethasone  "

## 2024-02-20 LAB
BASOPHILS # BLD AUTO: 0.05 10*3/MM3 (ref 0–0.2)
BASOPHILS NFR BLD AUTO: 0.4 % (ref 0–1.5)
DEPRECATED RDW RBC AUTO: 40 FL (ref 37–54)
EOSINOPHIL # BLD AUTO: 0.04 10*3/MM3 (ref 0–0.4)
EOSINOPHIL NFR BLD AUTO: 0.3 % (ref 0.3–6.2)
ERYTHROCYTE [DISTWIDTH] IN BLOOD BY AUTOMATED COUNT: 13.1 % (ref 12.3–15.4)
GLUCOSE BLDC GLUCOMTR-MCNC: 100 MG/DL (ref 70–130)
GLUCOSE BLDC GLUCOMTR-MCNC: 68 MG/DL (ref 70–130)
GLUCOSE BLDC GLUCOMTR-MCNC: 69 MG/DL (ref 70–130)
HCT VFR BLD AUTO: 30.8 % (ref 34–46.6)
HGB BLD-MCNC: 10.3 G/DL (ref 12–15.9)
IMM GRANULOCYTES # BLD AUTO: 0.28 10*3/MM3 (ref 0–0.05)
IMM GRANULOCYTES NFR BLD AUTO: 2.2 % (ref 0–0.5)
LYMPHOCYTES # BLD AUTO: 2.05 10*3/MM3 (ref 0.7–3.1)
LYMPHOCYTES NFR BLD AUTO: 16.3 % (ref 19.6–45.3)
MCH RBC QN AUTO: 28.5 PG (ref 26.6–33)
MCHC RBC AUTO-ENTMCNC: 33.4 G/DL (ref 31.5–35.7)
MCV RBC AUTO: 85.1 FL (ref 79–97)
MONOCYTES # BLD AUTO: 1.49 10*3/MM3 (ref 0.1–0.9)
MONOCYTES NFR BLD AUTO: 11.9 % (ref 5–12)
NEUTROPHILS NFR BLD AUTO: 68.9 % (ref 42.7–76)
NEUTROPHILS NFR BLD AUTO: 8.64 10*3/MM3 (ref 1.7–7)
NRBC BLD AUTO-RTO: 0.1 /100 WBC (ref 0–0.2)
PLATELET # BLD AUTO: 246 10*3/MM3 (ref 140–450)
PMV BLD AUTO: 10.4 FL (ref 6–12)
RBC # BLD AUTO: 3.62 10*6/MM3 (ref 3.77–5.28)
WBC NRBC COR # BLD AUTO: 12.55 10*3/MM3 (ref 3.4–10.8)

## 2024-02-20 PROCEDURE — 0503F POSTPARTUM CARE VISIT: CPT | Performed by: OBSTETRICS & GYNECOLOGY

## 2024-02-20 PROCEDURE — 85025 COMPLETE CBC W/AUTO DIFF WBC: CPT | Performed by: OBSTETRICS & GYNECOLOGY

## 2024-02-20 PROCEDURE — 82948 REAGENT STRIP/BLOOD GLUCOSE: CPT

## 2024-02-20 RX ORDER — LABETALOL 200 MG/1
200 TABLET, FILM COATED ORAL EVERY 8 HOURS SCHEDULED
Status: DISCONTINUED | OUTPATIENT
Start: 2024-02-20 | End: 2024-02-22

## 2024-02-20 RX ADMIN — DOCUSATE SODIUM 100 MG: 100 CAPSULE, LIQUID FILLED ORAL at 20:08

## 2024-02-20 RX ADMIN — HYDROCODONE BITARTRATE AND ACETAMINOPHEN 1 TABLET: 10; 325 TABLET ORAL at 03:59

## 2024-02-20 RX ADMIN — DOCUSATE SODIUM 100 MG: 100 CAPSULE, LIQUID FILLED ORAL at 08:07

## 2024-02-20 RX ADMIN — HYDROCODONE BITARTRATE AND ACETAMINOPHEN 1 TABLET: 5; 325 TABLET ORAL at 08:07

## 2024-02-20 RX ADMIN — MONTELUKAST SODIUM 10 MG: 10 TABLET, FILM COATED ORAL at 20:08

## 2024-02-20 RX ADMIN — FLUOXETINE HYDROCHLORIDE 40 MG: 20 CAPSULE ORAL at 20:08

## 2024-02-20 RX ADMIN — LABETALOL HYDROCHLORIDE 200 MG: 200 TABLET, FILM COATED ORAL at 22:15

## 2024-02-20 RX ADMIN — IBUPROFEN 600 MG: 600 TABLET, FILM COATED ORAL at 12:48

## 2024-02-20 RX ADMIN — Medication: at 08:00

## 2024-02-20 RX ADMIN — LABETALOL HYDROCHLORIDE 200 MG: 200 TABLET, FILM COATED ORAL at 14:31

## 2024-02-20 RX ADMIN — IBUPROFEN 600 MG: 600 TABLET, FILM COATED ORAL at 03:59

## 2024-02-20 RX ADMIN — NIFEDIPINE 60 MG: 60 TABLET, FILM COATED, EXTENDED RELEASE ORAL at 08:07

## 2024-02-20 NOTE — PLAN OF CARE
Problem: Adult Inpatient Plan of Care  Goal: Plan of Care Review  Outcome: Ongoing, Progressing  Flowsheets  Taken 2/20/2024 0840 by Simran Shah RN  Progress: improving  Outcome Evaluation: VSS, assessments wnl, d/c'd catheter and dtv @1407, pain well controlled, pumping, ambualting well x1, neuro checks wnl and pt baseline.  Taken 2/19/2024 1732 by Marika Hawthorne RN  Plan of Care Reviewed With: patient  Goal: Patient-Specific Goal (Individualized)  Outcome: Ongoing, Progressing  Goal: Absence of Hospital-Acquired Illness or Injury  Outcome: Ongoing, Progressing  Intervention: Identify and Manage Fall Risk  Recent Flowsheet Documentation  Taken 2/20/2024 0807 by Simran Shah RN  Safety Promotion/Fall Prevention: safety round/check completed  Intervention: Prevent Skin Injury  Recent Flowsheet Documentation  Taken 2/20/2024 0807 by Simran Shah RN  Body Position: position changed independently  Intervention: Prevent and Manage VTE (Venous Thromboembolism) Risk  Recent Flowsheet Documentation  Taken 2/20/2024 0807 by Simran Shah RN  Activity Management: ambulated to bathroom  VTE Prevention/Management:   bilateral   sequential compression devices on  Goal: Optimal Comfort and Wellbeing  Outcome: Ongoing, Progressing  Intervention: Monitor Pain and Promote Comfort  Recent Flowsheet Documentation  Taken 2/20/2024 0807 by Simran Shah RN  Pain Management Interventions:   care clustered   see MAR   quiet environment facilitated  Intervention: Provide Person-Centered Care  Recent Flowsheet Documentation  Taken 2/20/2024 0807 by Simran Shah RN  Trust Relationship/Rapport:   care explained   choices provided  Goal: Readiness for Transition of Care  Outcome: Ongoing, Progressing     Problem: Hypertensive Disorders in Pregnancy  Goal: Maternal-Fetal Stabilization  Outcome: Ongoing, Progressing     Problem: Pain Acute  Goal: Acceptable Pain Control and Functional Ability  Outcome: Ongoing,  Progressing  Intervention: Develop Pain Management Plan  Recent Flowsheet Documentation  Taken 2/20/2024 0807 by Simran Shah RN  Pain Management Interventions:   care clustered   see MAR   quiet environment facilitated     Problem: Fall Injury Risk  Goal: Absence of Fall and Fall-Related Injury  Outcome: Ongoing, Progressing  Intervention: Promote Injury-Free Environment  Recent Flowsheet Documentation  Taken 2/20/2024 0807 by Simran Shah RN  Safety Promotion/Fall Prevention: safety round/check completed     Problem: Skin Injury Risk Increased  Goal: Skin Health and Integrity  Outcome: Ongoing, Progressing  Intervention: Optimize Skin Protection  Recent Flowsheet Documentation  Taken 2/20/2024 0807 by Simran Shah RN  Head of Bed (HOB) Positioning: HOB at 45 degrees     Problem: Adjustment to Role Transition (Postpartum Vaginal Delivery)  Goal: Successful Maternal Role Transition  Outcome: Ongoing, Progressing     Problem: Bleeding (Postpartum Vaginal Delivery)  Goal: Hemostasis  Outcome: Ongoing, Progressing     Problem: Infection (Postpartum Vaginal Delivery)  Goal: Absence of Infection Signs/Symptoms  Outcome: Ongoing, Progressing  Intervention: Prevent or Manage Infection  Recent Flowsheet Documentation  Taken 2/20/2024 0807 by Simran Shah RN  Perineal Care:   absorbent brief/pad changed   cold pack/ice pack applied   perineum cleansed     Problem: Pain (Postpartum Vaginal Delivery)  Goal: Acceptable Pain Control  Outcome: Ongoing, Progressing  Intervention: Prevent or Manage Pain  Recent Flowsheet Documentation  Taken 2/20/2024 0807 by Simran Shah RN  Pain Management Interventions:   care clustered   see MAR   quiet environment facilitated     Problem: Urinary Retention (Postpartum Vaginal Delivery)  Goal: Effective Urinary Elimination  Outcome: Ongoing, Progressing     Problem: Breastfeeding  Goal: Effective Breastfeeding  Outcome: Ongoing, Progressing   Goal Outcome Evaluation:            Progress: improving  Outcome Evaluation: VSS, assessments wnl, d/c'd catheter and dtv @1407, pain well controlled, pumping, ambualting well x1, neuro checks wnl and pt baseline.

## 2024-02-20 NOTE — PLAN OF CARE
Goal Outcome Evaluation:         Mag sulfate therapy complete at 0507. VSS. Adequate output. Norco and Motrin given PRN for pain. Pumping Q3hr and taken to NICU. Scant bleeding, fundus is firm. Pt. Sleeping between care

## 2024-02-20 NOTE — PROGRESS NOTES
"Postpartum Vaginal Delivery Note PPD#1    CC: PPD 1 s/p     Assessment:   Pt doing well. Pain well controlled. Lochia normal. Ambulating well. Tolerating regular diet. Voiding without difficulty.   No headache,  Episode of vasovagal type symptoms resolved with eating and rest.    BPs borderline severe      Review of Systems - Negative except faint feeling episode x 1    Vitals:   /97 (BP Location: Right arm, Patient Position: Lying)   Pulse 89   Temp 97.4 °F (36.3 °C) (Oral)   Resp 16   Ht 162.6 cm (64\")   LMP  (LMP Unknown)   SpO2 100%   Breastfeeding Yes   BMI 39.82 kg/m²         Exam:   Gen: NAD, cooperative, conversive  Neck:  No LAD or TM  Lungs: non labored breathing  Heart:  RRR  Abd: Soft, nondistended, fundus is firm below umbillicus, approp tender  Ext: Nontender bilaterally, trace edema bilateral    Labs:  Lab Results (last 24 hours)       Procedure Component Value Units Date/Time    POC Glucose Once [[id number]]  (Normal) Collected: 24 1243    Specimen: Blood Updated: 24 1247     Glucose 100 mg/dL     POC Glucose Once [[id number]]  (Abnormal) Collected: 24 1222    Specimen: Blood Updated: 24 1224     Glucose 69 mg/dL     POC Glucose Once [[id number]]  (Abnormal) Collected: 24 1206    Specimen: Blood Updated: 24 1212     Glucose 68 mg/dL     CBC & Differential [[id number]]  (Abnormal) Collected: 24 0710    Specimen: Blood Updated: 24 1087    Narrative:      The following orders were created for panel order CBC & Differential.  Procedure                               Abnormality         Status                     ---------                               -----------         ------                     CBC Auto Differential[[id number]]        Abnormal            Final result                 Please view results for these tests on the individual orders.    CBC Auto Differential [[id number]]  (Abnormal) Collected: 24 0710    Specimen: Blood Updated: " 24 0757     WBC 12.55 10*3/mm3      RBC 3.62 10*6/mm3      Hemoglobin 10.3 g/dL      Hematocrit 30.8 %      MCV 85.1 fL      MCH 28.5 pg      MCHC 33.4 g/dL      RDW 13.1 %      RDW-SD 40.0 fl      MPV 10.4 fL      Platelets 246 10*3/mm3      Neutrophil % 68.9 %      Lymphocyte % 16.3 %      Monocyte % 11.9 %      Eosinophil % 0.3 %      Basophil % 0.4 %      Immature Grans % 2.2 %      Neutrophils, Absolute 8.64 10*3/mm3      Lymphocytes, Absolute 2.05 10*3/mm3      Monocytes, Absolute 1.49 10*3/mm3      Eosinophils, Absolute 0.04 10*3/mm3      Basophils, Absolute 0.05 10*3/mm3      Immature Grans, Absolute 0.28 10*3/mm3      nRBC 0.1 /100 WBC             Assessment: Wilda Evans is a 28 y.o. female  s/p   Patient Active Problem List   Diagnosis    Asthma during pregnancy    Supervision of normal pregnancy    Acne    Pregnancy    Severe pre-eclampsia in third trimester    Preeclampsia, severe, third trimester       Plan:  1. Routine Postpartum care  2. Ambulation encouraged.  3. BPs are borderline with some values close to severe. No headache. On procardia 60 mg daily, will add labetalol.          Signed By:  Yoselyn Zamora MD       2024 13:13 EST

## 2024-02-20 NOTE — PROGRESS NOTES
"Discharge Planning Assessment  [facility]     Patient Name: Wilda Evans  MRN: [id number]  Today's Date: 2/20/2024    Admit Date: 2/17/2024    Plan: Infant may discharge to mother when medically ready. UYEN Solis.   Discharge Needs Assessment    No documentation.                  Discharge Plan       Row Name 02/20/24 1041       Plan    Plan Infant may discharge to mother when medically ready. UYEN Solis.    Plan Comments Mother: Wilda Evans \"Wilda\", MRN: [id number]; infant: Jayleen \"Eduard\" Cristina, MRN: [id number]. CSW was not consulted but saw mother for \"NICU admission.\" Of note, no toxicology screens were ordered for mother or infant as need was not warranted at this time. CSW met with mother and maternal grandma in infant's NICU room. Mother gave consent for MGM to be present during assessment. Mother verified address, phone number, and insurance. Mother asked how to add infant to health insurance. CSW educated mother on different ways to add infant to health insurance. Mother voiced understanding. Mother reports having a car seat, crib/bassinet, clothes, and diapers for infant. This is mother and father's first baby. Mother reports, maternal grandparents, paternal grandparents, father of infant/, and other family members are available for support as needed. Mother reports infant is following up with Dr. Yadav after discharge; mother is comfortable scheduling appointments for infant and has reliable transportation. Mother is not current with [facility] but is familiar with the program. Mother asked about WIC qualifications. CSW spent time discussing different qualifications for [place]. Mother voiced understanding. CSW explained her role as NICU  and encouraged mother to reach out if needed. CSW provided mother with a packet of resources including: WIC, HANDS, transportation, infant supplies, counseling, online support groups, postpartum mood and anxiety resources, NICU parent resources, and " general community resources. CSW spent time building rapport with mother, and offered validation, support, and encouragement to mother throughout assessment. Mother was polite and appropriate, and denied having unmet needs or concerns at this time. CSW will remain available for psychosocial needs while infant is in the NICU. UYEN Solis.                  Continued Care and Services - Admitted Since 2/17/2024    Coordination has not been started for this encounter.          Demographic Summary       Row Name 02/20/24 1040       General Information    Admission Type inpatient    Arrived From home    Reason for Consult other (see comments)    General Information Comments NICU admission.                   Functional Status       Row Name 02/20/24 1040       Functional Status, IADL    Medications independent    Meal Preparation independent    Housekeeping independent    Laundry independent    Shopping independent       Mental Status    General Appearance WDL WDL       Mental Status Summary    Recent Changes in Mental Status/Cognitive Functioning no changes       Employment/    Employment Status employed full-time    Current or Previous Occupation education    Employment/ Comments [employer]                   Psychosocial       Row Name 02/20/24 1041       Behavior WDL    Behavior WDL WDL       Emotion Mood WDL    Emotion/Mood/Affect WDL WDL       Speech WDL    Speech WDL WDL       Perceptual State WDL    Perceptual State WDL WDL       Thought Process WDL    Thought Process WDL WDL       Intellectual Performance WDL    Intellectual Performance WDL WDL       Coping/Stress    Major Change/Loss/Stressor birth    Patient Personal Strengths motivated;future/goal oriented;positive attitude;strong support system    Sources of Support other family members;parent(s);spouse                   Abuse/Neglect       Row Name 02/20/24 1041       Personal Safety    Physical Signs of Abuse Present no                   Legal     No documentation.                  Substance Abuse    No documentation.                  Patient Forms    No documentation.                     MACY Merlos

## 2024-02-21 PROCEDURE — 0503F POSTPARTUM CARE VISIT: CPT | Performed by: OBSTETRICS & GYNECOLOGY

## 2024-02-21 RX ORDER — CALCIUM CARBONATE 500 MG/1
1 TABLET, CHEWABLE ORAL 3 TIMES DAILY PRN
Status: DISCONTINUED | OUTPATIENT
Start: 2024-02-21 | End: 2024-02-23 | Stop reason: HOSPADM

## 2024-02-21 RX ADMIN — LABETALOL HYDROCHLORIDE 200 MG: 200 TABLET, FILM COATED ORAL at 14:53

## 2024-02-21 RX ADMIN — DOCUSATE SODIUM 100 MG: 100 CAPSULE, LIQUID FILLED ORAL at 08:18

## 2024-02-21 RX ADMIN — DOCUSATE SODIUM 100 MG: 100 CAPSULE, LIQUID FILLED ORAL at 21:44

## 2024-02-21 RX ADMIN — ACETAMINOPHEN 325MG 650 MG: 325 TABLET ORAL at 21:51

## 2024-02-21 RX ADMIN — FLUOXETINE HYDROCHLORIDE 40 MG: 20 CAPSULE ORAL at 21:45

## 2024-02-21 RX ADMIN — IBUPROFEN 600 MG: 600 TABLET, FILM COATED ORAL at 06:18

## 2024-02-21 RX ADMIN — LABETALOL HYDROCHLORIDE 200 MG: 200 TABLET, FILM COATED ORAL at 06:06

## 2024-02-21 RX ADMIN — MONTELUKAST SODIUM 10 MG: 10 TABLET, FILM COATED ORAL at 21:45

## 2024-02-21 RX ADMIN — NIFEDIPINE 60 MG: 60 TABLET, FILM COATED, EXTENDED RELEASE ORAL at 08:18

## 2024-02-21 RX ADMIN — LABETALOL HYDROCHLORIDE 200 MG: 200 TABLET, FILM COATED ORAL at 21:44

## 2024-02-21 RX ADMIN — IBUPROFEN 600 MG: 600 TABLET, FILM COATED ORAL at 16:39

## 2024-02-21 RX ADMIN — ANTACID TABLETS 1 TABLET: 500 TABLET, CHEWABLE ORAL at 03:44

## 2024-02-21 RX ADMIN — IBUPROFEN 600 MG: 600 TABLET, FILM COATED ORAL at 23:39

## 2024-02-21 NOTE — LACTATION NOTE
This note was copied from a baby's chart.  P1, 34/3, PCOS, Mom is pumping every 3 hours getting around 1 ml ebm. She has been using a 24mm flange but believes it is rubbing. Reviewed how to tell if fit is correct and asked mom to switch to a 27mm tonight.  LC will assess in am. LC number on WB.

## 2024-02-21 NOTE — PROGRESS NOTES
[facility]   Obstetrics and Gynecology     2024    Name:Wilda Evans   MR#:[id number]    Vaginal Delivery Progress Note    HD#4    Subjective   Postpartum Day 2: 28 y.o. female  delivered at 34w3d  delivered a male  infant.     The patient feels well.  Her pain is controlled.    She is ambulating well.  Patient describes her bleeding as thin lochia.    Breastfeeding/bottle:  The patient is currently breastfeeding.    Patient Active Problem List   Diagnosis    Asthma during pregnancy    Supervision of normal pregnancy    Acne    Pregnancy    Severe pre-eclampsia in third trimester    Preeclampsia, severe, third trimester       Objective   Vital Signs Range for the last 24 hours  Temp: Temp:  [98.1 °F (36.7 °C)-98.5 °F (36.9 °C)] 98.1 °F (36.7 °C) Temp src: Oral   BP: BP: (129-160)/(81-98) 134/81        Pulse: Heart Rate:  [] 68  RR: Resp:  [14-18] 18  Weight:    BMI:  Body mass index is 39.82 kg/m².    Results from last 7 days   Lab Units 24  0710 24  1150 24  1026   WBC 10*3/mm3 12.55* 15.21* 12.36*   HEMOGLOBIN g/dL 10.3* 11.1* 11.9*   HEMATOCRIT % 30.8* 33.6* 36.6   PLATELETS 10*3/mm3 246 324 278     Results from last 7 days   Lab Units 24  1150 24  1026   SODIUM mmol/L 134* 136   POTASSIUM mmol/L 4.4 4.0   CHLORIDE mmol/L 102 102   CO2 mmol/L 18.8* 20.3*   BUN mg/dL 14 11   CREATININE mg/dL 0.67 0.63   CALCIUM mg/dL 7.5* 8.9   ALK PHOS U/L 149* 178*   ALT (SGPT) U/L 38* 43*   AST (SGOT) U/L 27 39*   GLUCOSE mg/dL 108* 87       Physical Exam  Vitals and nursing note reviewed.   Constitutional:       Appearance: Normal appearance. She is obese.   Pulmonary:      Effort: Pulmonary effort is normal.   Musculoskeletal:      Right lower leg: Edema present.      Left lower leg: Edema present.   Skin:     General: Skin is warm and dry.   Neurological:      Mental Status: She is alert and oriented to person, place, and time.   Psychiatric:         Mood and  Affect: Mood normal.         Behavior: Behavior normal.         Assessment/Plan   1.  PPD# 2    Severe pre-eclampsia in third trimester    Asthma during pregnancy    Pregnancy    Preeclampsia, severe, third trimester      Plan:   Continue routine postpartum care  Monitor BP - continue Procardia and Labetalol   Infant stable in NICU   Anu King MD  2/21/2024 10:13 EST

## 2024-02-21 NOTE — PLAN OF CARE
Goal Outcome Evaluation:           Progress: improving  Outcome Evaluation: VSS, pt is voiding spontaneously and ambulating well x1 with no complaints of dizziness or fatigue. Neuro checks WNL. Pumping breastmilk and having it sent over to NICU baby.

## 2024-02-21 NOTE — PLAN OF CARE
Goal Outcome Evaluation:           Progress: improving  Outcome Evaluation: vitals stable, assessment wdl, up ad farhad, walking to nicu, neuro checks wdl, pumping

## 2024-02-21 NOTE — LACTATION NOTE
This note was copied from a baby's chart.  Called to assist setting up PBP. Mom had pump together correctly and was pumping when I entered room. We changed flange size to 24 mm and reviewed the settings. Colostrum is present. Encouraged family to read instructions for regular cleaning and sterilizing with baby being in NICU. They did not have any other concerns. LC number on WB.

## 2024-02-22 LAB
ALBUMIN SERPL-MCNC: 2.9 G/DL (ref 3.5–5.2)
ALBUMIN SERPL-MCNC: 3 G/DL (ref 3.5–5.2)
ALBUMIN/GLOB SERPL: 1 G/DL
ALBUMIN/GLOB SERPL: 1 G/DL
ALP SERPL-CCNC: 108 U/L (ref 39–117)
ALP SERPL-CCNC: 114 U/L (ref 39–117)
ALT SERPL W P-5'-P-CCNC: 260 U/L (ref 1–33)
ALT SERPL W P-5'-P-CCNC: 264 U/L (ref 1–33)
ANION GAP SERPL CALCULATED.3IONS-SCNC: 11.9 MMOL/L (ref 5–15)
ANION GAP SERPL CALCULATED.3IONS-SCNC: 11.9 MMOL/L (ref 5–15)
AST SERPL-CCNC: 187 U/L (ref 1–32)
AST SERPL-CCNC: 231 U/L (ref 1–32)
BASOPHILS # BLD AUTO: 0.04 10*3/MM3 (ref 0–0.2)
BASOPHILS NFR BLD AUTO: 0.3 % (ref 0–1.5)
BILIRUB SERPL-MCNC: 0.2 MG/DL (ref 0–1.2)
BILIRUB SERPL-MCNC: <0.2 MG/DL (ref 0–1.2)
BUN SERPL-MCNC: 11 MG/DL (ref 6–20)
BUN SERPL-MCNC: 9 MG/DL (ref 6–20)
BUN/CREAT SERPL: 15.8 (ref 7–25)
BUN/CREAT SERPL: 20 (ref 7–25)
CALCIUM SPEC-SCNC: 8.6 MG/DL (ref 8.6–10.5)
CALCIUM SPEC-SCNC: 8.7 MG/DL (ref 8.6–10.5)
CHLORIDE SERPL-SCNC: 101 MMOL/L (ref 98–107)
CHLORIDE SERPL-SCNC: 102 MMOL/L (ref 98–107)
CO2 SERPL-SCNC: 23.1 MMOL/L (ref 22–29)
CO2 SERPL-SCNC: 24.1 MMOL/L (ref 22–29)
CREAT SERPL-MCNC: 0.55 MG/DL (ref 0.57–1)
CREAT SERPL-MCNC: 0.57 MG/DL (ref 0.57–1)
DEPRECATED RDW RBC AUTO: 38.7 FL (ref 37–54)
EGFRCR SERPLBLD CKD-EPI 2021: 126.3 ML/MIN/1.73
EGFRCR SERPLBLD CKD-EPI 2021: 127.4 ML/MIN/1.73
EOSINOPHIL # BLD AUTO: 0.36 10*3/MM3 (ref 0–0.4)
EOSINOPHIL NFR BLD AUTO: 2.8 % (ref 0.3–6.2)
ERYTHROCYTE [DISTWIDTH] IN BLOOD BY AUTOMATED COUNT: 12.9 % (ref 12.3–15.4)
GLOBULIN UR ELPH-MCNC: 2.9 GM/DL
GLOBULIN UR ELPH-MCNC: 3 GM/DL
GLUCOSE SERPL-MCNC: 104 MG/DL (ref 65–99)
GLUCOSE SERPL-MCNC: 112 MG/DL (ref 65–99)
HCT VFR BLD AUTO: 29.1 % (ref 34–46.6)
HGB BLD-MCNC: 9.6 G/DL (ref 12–15.9)
IMM GRANULOCYTES # BLD AUTO: 0.33 10*3/MM3 (ref 0–0.05)
IMM GRANULOCYTES NFR BLD AUTO: 2.6 % (ref 0–0.5)
LYMPHOCYTES # BLD AUTO: 1.57 10*3/MM3 (ref 0.7–3.1)
LYMPHOCYTES NFR BLD AUTO: 12.2 % (ref 19.6–45.3)
MCH RBC QN AUTO: 27.6 PG (ref 26.6–33)
MCHC RBC AUTO-ENTMCNC: 33 G/DL (ref 31.5–35.7)
MCV RBC AUTO: 83.6 FL (ref 79–97)
MONOCYTES # BLD AUTO: 1.01 10*3/MM3 (ref 0.1–0.9)
MONOCYTES NFR BLD AUTO: 7.8 % (ref 5–12)
NEUTROPHILS NFR BLD AUTO: 74.3 % (ref 42.7–76)
NEUTROPHILS NFR BLD AUTO: 9.59 10*3/MM3 (ref 1.7–7)
NRBC BLD AUTO-RTO: 0 /100 WBC (ref 0–0.2)
PLATELET # BLD AUTO: 272 10*3/MM3 (ref 140–450)
PMV BLD AUTO: 9.6 FL (ref 6–12)
POTASSIUM SERPL-SCNC: 4 MMOL/L (ref 3.5–5.2)
POTASSIUM SERPL-SCNC: 4.2 MMOL/L (ref 3.5–5.2)
PROT SERPL-MCNC: 5.9 G/DL (ref 6–8.5)
PROT SERPL-MCNC: 5.9 G/DL (ref 6–8.5)
RBC # BLD AUTO: 3.48 10*6/MM3 (ref 3.77–5.28)
SODIUM SERPL-SCNC: 136 MMOL/L (ref 136–145)
SODIUM SERPL-SCNC: 138 MMOL/L (ref 136–145)
WBC NRBC COR # BLD AUTO: 12.9 10*3/MM3 (ref 3.4–10.8)

## 2024-02-22 PROCEDURE — 85025 COMPLETE CBC W/AUTO DIFF WBC: CPT | Performed by: OBSTETRICS & GYNECOLOGY

## 2024-02-22 PROCEDURE — 80053 COMPREHEN METABOLIC PANEL: CPT | Performed by: OBSTETRICS & GYNECOLOGY

## 2024-02-22 PROCEDURE — 0503F POSTPARTUM CARE VISIT: CPT | Performed by: OBSTETRICS & GYNECOLOGY

## 2024-02-22 RX ORDER — LABETALOL 200 MG/1
400 TABLET, FILM COATED ORAL EVERY 8 HOURS SCHEDULED
Status: DISCONTINUED | OUTPATIENT
Start: 2024-02-22 | End: 2024-02-23 | Stop reason: HOSPADM

## 2024-02-22 RX ORDER — LABETALOL 200 MG/1
200 TABLET, FILM COATED ORAL ONCE
Status: COMPLETED | OUTPATIENT
Start: 2024-02-22 | End: 2024-02-22

## 2024-02-22 RX ADMIN — DOCUSATE SODIUM 100 MG: 100 CAPSULE, LIQUID FILLED ORAL at 08:02

## 2024-02-22 RX ADMIN — MONTELUKAST SODIUM 10 MG: 10 TABLET, FILM COATED ORAL at 20:18

## 2024-02-22 RX ADMIN — NIFEDIPINE 60 MG: 60 TABLET, FILM COATED, EXTENDED RELEASE ORAL at 08:02

## 2024-02-22 RX ADMIN — ACETAMINOPHEN 325MG 650 MG: 325 TABLET ORAL at 20:23

## 2024-02-22 RX ADMIN — FLUOXETINE HYDROCHLORIDE 40 MG: 20 CAPSULE ORAL at 20:18

## 2024-02-22 RX ADMIN — LABETALOL HYDROCHLORIDE 200 MG: 200 TABLET, FILM COATED ORAL at 06:17

## 2024-02-22 RX ADMIN — LABETALOL HYDROCHLORIDE 400 MG: 200 TABLET, FILM COATED ORAL at 14:21

## 2024-02-22 RX ADMIN — DOCUSATE SODIUM 100 MG: 100 CAPSULE, LIQUID FILLED ORAL at 20:18

## 2024-02-22 RX ADMIN — IBUPROFEN 600 MG: 600 TABLET, FILM COATED ORAL at 12:05

## 2024-02-22 RX ADMIN — LABETALOL HYDROCHLORIDE 400 MG: 200 TABLET, FILM COATED ORAL at 22:42

## 2024-02-22 RX ADMIN — LABETALOL HYDROCHLORIDE 200 MG: 200 TABLET, FILM COATED ORAL at 10:06

## 2024-02-22 RX ADMIN — IBUPROFEN 600 MG: 600 TABLET, FILM COATED ORAL at 06:17

## 2024-02-22 NOTE — LACTATION NOTE
Pt in NICU but dad reports pt is pumping every 3 hours and getting 10 cc's of colostrum now. Encouraged to have pt call LC if as needed.    Lactation Consult Note    Evaluation Completed: 2024 10:26 EST  Patient Name: Wilda Evans  :  1995  MRN:  [id number]     REFERRAL  INFORMATION:                                         DELIVERY HISTORY:        Skin to skin initiation date/time:      Skin to skin end date/time:           MATERNAL ASSESSMENT:                               INFANT ASSESSMENT:  Information for the patient's :  Cristina RamónJaleelrosa [[id number]]   History reviewed. No pertinent past medical history.                                                                                                   MATERNAL INFANT FEEDING:                                                                       EQUIPMENT TYPE:                                 BREAST PUMPING:          LACTATION REFERRALS:

## 2024-02-22 NOTE — LACTATION NOTE
This note was copied from a baby's chart.  Mom attempting to latch baby for the first time. Baby latched very easily to both breasts. Baby has to be re latched ocassionally but has strong tugs and is very eager. Mom reports she is pumping about 10 cc's now. Educated on importance of deep latching and ways to achieve it. Encouraged BF as much as baby is able and cont to pump. Parents deny questions. Encouraged to call LC as needed.    Lactation Consult Note    Evaluation Completed: 2024 15:16 EST  Patient Name: Jayleen Evans  :  2025  MRN:  [id number]     REFERRAL  INFORMATION:                                         DELIVERY HISTORY:  This patient has no babies on file.  This patient has no babies on file.  Skin to skin initiation date/time:      Skin to skin end date/time:      This patient has no babies on file.    MATERNAL ASSESSMENT:                               INFANT ASSESSMENT:  This patient has no babies on file.  This patient has no babies on file.  This patient has no babies on file.  This patient has no babies on file.  This patient has no babies on file.  This patient has no babies on file.  This patient has no babies on file.  This patient has no babies on file.  This patient has no babies on file.  This patient has no babies on file.  This patient has no babies on file.  This patient has no babies on file.  This patient has no babies on file.  This patient has no babies on file.  This patient has no babies on file.  This patient has no babies on file.  This patient has no babies on file.  This patient has no babies on file.  This patient has no babies on file.  This patient has no babies on file.      This patient has no babies on file.  This patient has no babies on file.  This patient has no babies on file.  This patient has no babies on file.  This patient has no babies on file.  This patient has no babies on file.    This patient has no babies on file.  This patient has no babies on  file.  This patient has no babies on file.        MATERNAL INFANT FEEDING:        Infant Positioning: cross-cradle, clutch/football (02/22/24 1500)   Signs of Milk Transfer: deep jaw excursions noted (02/22/24 1500)                                                          EQUIPMENT TYPE:                                 BREAST PUMPING:          LACTATION REFERRALS:

## 2024-02-22 NOTE — PLAN OF CARE
Goal Outcome Evaluation:           Progress: improving  Outcome Evaluation: VSS, voiding spontaneously, ambulating independently, pumping for baby in NICU

## 2024-02-22 NOTE — PROGRESS NOTES
[facility]   Obstetrics and Gynecology     2024    Name:Wilda Evans   MR#:[id number]    Vaginal Delivery Progress Note    HD#5    Subjective   Postpartum Day 3: 28 y.o. female  delivered at 34w3d  delivered a male  infant.     The patient feels well.  Her pain is controlled.    She is ambulating well.  Patient describes her bleeding as thin lochia. No headache, vision changes, just feels a bit lightheaded when initially waking up but not when ambulating.    Breastfeeding/bottle:  The patient is currently breastfeeding.    Patient Active Problem List   Diagnosis    Asthma during pregnancy    Supervision of normal pregnancy    Acne    Pregnancy    Severe pre-eclampsia in third trimester    Preeclampsia, severe, third trimester       Objective   Vital Signs Range for the last 24 hours  Temp: Temp:  [97.4 °F (36.3 °C)-98.4 °F (36.9 °C)] 97.6 °F (36.4 °C) Temp src: Oral   BP: BP: (134-141)/(80-95) 134/84        Pulse: Heart Rate:  [] 98  RR: Resp:  [16-18] 18  Weight:    BMI:  Body mass index is 39.82 kg/m².    Results from last 7 days   Lab Units 24  0710 24  1150 24  1026   WBC 10*3/mm3 12.55* 15.21* 12.36*   HEMOGLOBIN g/dL 10.3* 11.1* 11.9*   HEMATOCRIT % 30.8* 33.6* 36.6   PLATELETS 10*3/mm3 246 324 278     Results from last 7 days   Lab Units 24  1150 24  1026   SODIUM mmol/L 134* 136   POTASSIUM mmol/L 4.4 4.0   CHLORIDE mmol/L 102 102   CO2 mmol/L 18.8* 20.3*   BUN mg/dL 14 11   CREATININE mg/dL 0.67 0.63   CALCIUM mg/dL 7.5* 8.9   ALK PHOS U/L 149* 178*   ALT (SGPT) U/L 38* 43*   AST (SGOT) U/L 27 39*   GLUCOSE mg/dL 108* 87       Physical Exam  Well, no distress  Regular, nonlabored breathing  Fundus firm, well below umbilicus  Calves nontender  1+ pitting edema BL LE  Brisk patellar reflex on the left and some clonus as well    Assessment/Plan   1.  PPD# 3    Severe pre-eclampsia in third trimester    Asthma during pregnancy    Pregnancy     Preeclampsia, severe, third trimester      Plan:   She has some clonus and brisk reflexes, also BP not adequately controlled and increasing medication, recommended monitoring inpatient another day and repeat labs as she has had a transaminitis    Stephanie Tadeo MD  2/22/2024 10:40 EST

## 2024-02-23 VITALS
RESPIRATION RATE: 18 BRPM | OXYGEN SATURATION: 98 % | SYSTOLIC BLOOD PRESSURE: 129 MMHG | DIASTOLIC BLOOD PRESSURE: 85 MMHG | HEIGHT: 64 IN | HEART RATE: 92 BPM | BODY MASS INDEX: 39.82 KG/M2 | TEMPERATURE: 97.6 F

## 2024-02-23 PROCEDURE — 0503F POSTPARTUM CARE VISIT: CPT | Performed by: STUDENT IN AN ORGANIZED HEALTH CARE EDUCATION/TRAINING PROGRAM

## 2024-02-23 RX ORDER — AMOXICILLIN 250 MG
1 CAPSULE ORAL 2 TIMES DAILY PRN
Qty: 60 TABLET | Refills: 1 | Status: SHIPPED | OUTPATIENT
Start: 2024-02-23

## 2024-02-23 RX ORDER — ACETAMINOPHEN 325 MG/1
650 TABLET ORAL EVERY 6 HOURS PRN
Qty: 60 TABLET | Refills: 1 | Status: SHIPPED | OUTPATIENT
Start: 2024-02-23

## 2024-02-23 RX ORDER — LABETALOL 200 MG/1
400 TABLET, FILM COATED ORAL EVERY 8 HOURS SCHEDULED
Qty: 90 TABLET | Refills: 1 | Status: SHIPPED | OUTPATIENT
Start: 2024-02-23

## 2024-02-23 RX ORDER — IBUPROFEN 600 MG/1
600 TABLET ORAL EVERY 6 HOURS PRN
Qty: 60 TABLET | Refills: 1 | Status: SHIPPED | OUTPATIENT
Start: 2024-02-23

## 2024-02-23 RX ADMIN — IBUPROFEN 600 MG: 600 TABLET, FILM COATED ORAL at 10:39

## 2024-02-23 RX ADMIN — LABETALOL HYDROCHLORIDE 400 MG: 200 TABLET, FILM COATED ORAL at 06:33

## 2024-02-23 RX ADMIN — NIFEDIPINE 60 MG: 60 TABLET, FILM COATED, EXTENDED RELEASE ORAL at 10:39

## 2024-02-23 RX ADMIN — DOCUSATE SODIUM 100 MG: 100 CAPSULE, LIQUID FILLED ORAL at 10:39

## 2024-02-23 NOTE — DISCHARGE SUMMARY
[facility]   Obstetrics and Gynecology   Vaginal delivery Discharge Summary      Date of Admission: 2024    Date of Discharge:        Patient: Wilda Evans      MR#:[id number]    Surgeon/OB: Calixto Graham     Discharge Diagnosis:   Vaginal Delivery at 34w3d, uncomplicated recovery    Severe pre-eclampsia in third trimester    Asthma during pregnancy    Pregnancy    Preeclampsia, severe, third trimester     (normal spontaneous vaginal delivery)      Procedures:  Vaginal, Spontaneous     2024    5:17 AM      Anesthesia:  Epidural     Hospital Course  Patient is a 28 y.o. female  at 34w3d status post vaginal delivery.    Uneventful recovery.  Patient is ambulating, tolerating a regular diet.  Perineum is intact.    Delivery complicated by preeclampsia with severe features for which she received magnesium sulfate during and after delivery.  Blood pressure was ultimately controlled with labetalol 400 mg every 8 and Procardia XL 60 mg daily.  On day of discharge, she was feeling well without any signs of preeclampsia and blood pressure was stable.  She is discharged with strict preeclampsia precautions and asked to check blood pressure twice daily.  Follow-up was scheduled in office in 3 days.    Infant:   male  fetus 2395 g (5 lb 4.5 oz)  with Apgar scores of 8 , 9  at five minutes.    Condition on Discharge:  Stable    Vital Signs  Temp:  [97.6 °F (36.4 °C)-98.4 °F (36.9 °C)] 97.6 °F (36.4 °C)  Heart Rate:  [] 92  Resp:  [16-18] 18  BP: (119-141)/(75-92) 129/85    Results from last 7 days   Lab Units 24  1118 24  0710 24  1150   WBC 10*3/mm3 12.90* 12.55* 15.21*   HEMOGLOBIN g/dL 9.6* 10.3* 11.1*   HEMATOCRIT % 29.1* 30.8* 33.6*   PLATELETS 10*3/mm3 272 246 324     Results from last 7 days   Lab Units 24  1719 24  1119 24  1150   SODIUM mmol/L 136 138 134*   POTASSIUM mmol/L 4.2 4.0 4.4   CHLORIDE mmol/L 101 102 102   CO2 mmol/L 23.1 24.1 18.8*    BUN mg/dL 11 9 14   CREATININE mg/dL 0.55* 0.57 0.67   CALCIUM mg/dL 8.7 8.6 7.5*   BILIRUBIN mg/dL <0.2 0.2 0.2   ALK PHOS U/L 108 114 149*   ALT (SGPT) U/L 260* 264* 38*   AST (SGOT) U/L 187* 231* 27   GLUCOSE mg/dL 104* 112* 108*         Discharge Disposition  Home or Self Care    Discharge Medications     Discharge Medications        New Medications        Instructions Start Date   acetaminophen 325 MG tablet  Commonly known as: TYLENOL   650 mg, Oral, Every 6 Hours PRN      ibuprofen 600 MG tablet  Commonly known as: ADVIL,MOTRIN   600 mg, Oral, Every 6 Hours PRN      labetalol 200 MG tablet  Commonly known as: NORMODYNE   400 mg, Oral, Every 8 Hours Scheduled      NIFEdipine CC 60 MG 24 hr tablet  Commonly known as: ADALAT CC   60 mg, Oral, Every 24 Hours Scheduled   Start Date: February 24, 2024     sennosides-docusate 8.6-50 MG per tablet  Commonly known as: PERICOLACE   1 tablet, Oral, 2 Times Daily PRN             Continue These Medications        Instructions Start Date   albuterol sulfate  (90 Base) MCG/ACT inhaler  Commonly known as: PROVENTIL HFA;VENTOLIN HFA;PROAIR HFA   2 puffs, Inhalation, Every 6 Hours PRN      busPIRone 10 MG tablet  Commonly known as: BUSPAR       clindamycin-benzoyl peroxide 1-5 % gel  Commonly known as: BenzaClin   1 application , Topical, 2 Times Daily      FLUoxetine 20 MG capsule  Commonly known as: PROzac   40 mg, Oral, Daily      montelukast 10 MG tablet  Commonly known as: SINGULAIR   10 mg, Oral, Nightly      omeprazole 20 MG capsule  Commonly known as: priLOSEC   20 mg, Oral, Daily      prenatal (CLASSIC) vitamin 28-0.8 MG tablet tablet  Generic drug: prenatal vitamin   1 tablet, Oral, Daily               Discharge Diet: Regular    Activity at Discharge:   Activity Instructions       Pelvic Rest              Follow-up Appointments  No future appointments.  Additional Instructions for the Follow-ups that You Need to Schedule       Call MD for problems /  concerns.   As directed      Call for problems with:   1.  Heavy bleeding:  Greater than a pad an hour for more than 2 hours  2.  Fever greater than 101.3   3.  Redness of the episiotomy or vaginal laceration and/or severe pain increased from discharge pain.    4.  Signs of postpartum preeclampsia:  headache, visual changes, elevated blood pressure    Order Comments: Call for problems with: 1.  Heavy bleeding:  Greater than a pad an hour for more than 2 hours 2.  Fever greater than 101.3 3.  Redness of the episiotomy or vaginal laceration and/or severe pain increased from discharge pain.  4.  Signs of postpartum preeclampsia:  headache, visual changes, elevated blood pressure         Discharge Follow-up with Specified Provider: Carlyle; 3 Days   As directed      To: Carlyle   Follow Up: 3 Days   Follow Up Details: with Primary OB                  Prenatal labs/vax:   Immunization History   Administered Date(s) Administered    ABRYSVO (RSV, 60+ or pregnant women 32-36 wks) 01/31/2024    COVID-19 (MODERNA) 1st,2nd,3rd Dose Monovalent 02/03/2021, 03/03/2021    Fluzone (or Fluarix & Flulaval for VFC) >6mos 10/19/2023    Tdap 01/03/2024         External Prenatal Results       Pregnancy Outside Results - Transcribed From Office Records - See Scanned Records For Details       Test Value Date Time    ABO  A  02/17/24 1054    Rh  Positive  02/17/24 1054    Antibody Screen  Negative  02/17/24 1054       Negative  09/06/23 1505    Varicella IgG  <135 index 09/06/23 1505    Rubella  14.40 index 09/06/23 1505    Hgb  9.6 g/dL 02/22/24 1118       10.3 g/dL 02/20/24 0710       11.1 g/dL 02/18/24 1150       11.9 g/dL 02/17/24 1026       12.5 g/dL 01/03/24 1220       12.2 g/dL 09/06/23 1505    Hct  29.1 % 02/22/24 1118       30.8 % 02/20/24 0710       33.6 % 02/18/24 1150       36.6 % 02/17/24 1026       36.7 % 01/03/24 1220       36.4 % 09/06/23 1505    Glucose Fasting GTT       Glucose Tolerance Test 1 hour       Glucose Tolerance  Test 3 hour       Gonorrhea (discrete)  Negative  09/06/23 1539    Chlamydia (discrete)  Negative  09/06/23 1539    RPR  Non Reactive  01/03/24 1220       Non Reactive  09/06/23 1505    VDRL       Syphilis Antibody       HBsAg  Negative  09/06/23 1505    Herpes Simplex Virus PCR       Herpes Simplex VIrus Culture       HIV  Non Reactive  09/06/23 1505    Hep C RNA Quant PCR       Hep C Antibody  Non Reactive  09/06/23 1505    AFP       Group B Strep  No Group B Streptococcus isolated  02/17/24 1337      ^ Unknown  02/17/24     GBS Susceptibility to Clindamycin       GBS Susceptibility to Erythromycin       Fetal Fibronectin       Genetic Testing, Maternal Blood                 Drug Screening       Test Value Date Time    Urine Drug Screen       Amphetamine Screen       Barbiturate Screen       Benzodiazepine Screen       Methadone Screen       Phencyclidine Screen       Opiates Screen       THC Screen       Cocaine Screen       Propoxyphene Screen       Buprenorphine Screen       Methamphetamine Screen       Oxycodone Screen       Tricyclic Antidepressants Screen                 Legend    ^: Historical                              Yanique Daniels MD  02/23/24  11:08 EST

## 2024-02-26 ENCOUNTER — POSTPARTUM VISIT (OUTPATIENT)
Dept: OBSTETRICS AND GYNECOLOGY | Age: 29
End: 2024-02-26
Payer: COMMERCIAL

## 2024-02-26 VITALS
HEIGHT: 64 IN | DIASTOLIC BLOOD PRESSURE: 78 MMHG | SYSTOLIC BLOOD PRESSURE: 122 MMHG | WEIGHT: 226 LBS | BODY MASS INDEX: 38.58 KG/M2

## 2024-02-26 DIAGNOSIS — O14.13 SEVERE PRE-ECLAMPSIA IN THIRD TRIMESTER: ICD-10-CM

## 2024-02-26 PROCEDURE — 99213 OFFICE O/P EST LOW 20 MIN: CPT | Performed by: NURSE PRACTITIONER

## 2024-02-26 NOTE — PROGRESS NOTES
Patient presents for a postpartum visit. She is 1 week postpartum following a spontaneous vaginal delivery. Pregnancy/delivery course was complicated by severe PIH.  I have fully reviewed the prenatal and intrapartum course. The delivery was at 34.3 gestational weeks. Outcome: spontaneous vaginal delivery. She received mag sulfate before and after delivery. Blood pressure was ultimately controlled with labetalol 400 mg every 8 and Procardia XL 60 mg daily which is what she is currently taking.  She is feeling very well.  States BPs at home have been normal.  Baby is doing well.  He is still in the NICU. Baby is feeding by breast and bottle. She is pumping and has no complaints. Bleeding thin lochia. Bowel function is normal. Bladder function is normal. Patient is not sexually active.  Postpartum depression screening: negative.    The following portions of the patient's history were reviewed and updated as appropriate: allergies, current medications, past family history, past medical history, past social history, past surgical history, and problem list.    Review of Systems  A comprehensive review of systems was negative.        Vitals:    02/26/24 1300   BP: 122/78     Physical Exam  Constitutional:       Appearance: She is well-developed.   Abdominal:      Palpations: Abdomen is soft.   Neurological:      Mental Status: She is alert and oriented to person, place, and time.      Deep Tendon Reflexes: Reflexes are normal and symmetric.   Psychiatric:         Behavior: Behavior normal.                Assessment & Plan    Diagnoses and all orders for this visit:    1. Postpartum follow-up (Primary)    2. Severe pre-eclampsia in third trimester        1 postpartum exam     Continue Labetalol 400mg and Procardia 60mg  Call with pressures greater than 140/90 or if she starts to feel symptomatic of low blood pressure  Follow up for 6 week pp visit with Dr Daniels

## 2024-02-28 NOTE — PROGRESS NOTES
Chief Complaint   Patient presents with    Routine Prenatal Visit     31w5d OB Check     Wilda Evans is doing well. Normal movement. No real ctx, bleeding. Just increase in fatigue lately.    bpm  FH 32 cm  Abrysvo vaccine today    Follow up in two weeks    Stephanie Tadeo MD  2/28/2024  15:05 EST

## 2024-02-29 ENCOUNTER — CLINICAL SUPPORT (OUTPATIENT)
Dept: OBSTETRICS AND GYNECOLOGY | Age: 29
End: 2024-02-29
Payer: COMMERCIAL

## 2024-02-29 ENCOUNTER — TELEPHONE (OUTPATIENT)
Dept: OBSTETRICS AND GYNECOLOGY | Age: 29
End: 2024-02-29
Payer: COMMERCIAL

## 2024-02-29 DIAGNOSIS — Z13.89 SCREENING FOR HEMATURIA OR PROTEINURIA: Primary | ICD-10-CM

## 2024-02-29 LAB
BILIRUB BLD-MCNC: ABNORMAL MG/DL
CLARITY, POC: ABNORMAL
COLOR UR: ABNORMAL
GLUCOSE UR STRIP-MCNC: ABNORMAL MG/DL
KETONES UR QL: ABNORMAL
LEUKOCYTE EST, POC: ABNORMAL
NITRITE UR-MCNC: POSITIVE MG/ML
PH UR: 5 [PH] (ref 5–8)
PROT UR STRIP-MCNC: ABNORMAL MG/DL
RBC # UR STRIP: ABNORMAL /UL
SP GR UR: 1.01 (ref 1–1.03)
UROBILINOGEN UR QL: ABNORMAL

## 2024-02-29 RX ORDER — CEPHALEXIN 500 MG/1
500 CAPSULE ORAL 3 TIMES DAILY
Qty: 21 CAPSULE | Refills: 0 | Status: SHIPPED | OUTPATIENT
Start: 2024-02-29 | End: 2024-03-07

## 2024-02-29 NOTE — TELEPHONE ENCOUNTER
Pt dropped off urine sample today for possible UTI, Pt C/O burning with urination, and urinary frequency. Urinalysis in pt chart and urine culture sent to Lab. Please advise

## 2024-03-01 ENCOUNTER — MATERNAL SCREENING (OUTPATIENT)
Dept: CALL CENTER | Facility: HOSPITAL | Age: 29
End: 2024-03-01
Payer: COMMERCIAL

## 2024-03-01 NOTE — OUTREACH NOTE
Maternal Screening Survey      Flowsheet Row Responses   Facility patient discharged from? [facility]   Attempt successful? Yes   Call start time    Call end time 113   Person spoke with today (if not patient) and relationship patient   EPD Scale: Able to Laugh 0-->as much as she always could   EPD Scale: Looked Forward 0-->as much as she ever did   EPD Scale: Blamed Self 0-->no, never   EPD Scale: Been Anxious 0-->no, not at all   EPD Scale: Felt Panicky 0-->no, not at all   EPD Scale: Things Getting on Top 0-->no, has been coping as well as ever   EPD Scale: Difficulty Sleeping 0-->no, not at all   EPD Scale: Sad or Miserable 0-->no, not at all   EPD Scale: Crying 0-->no, never   EPD Scale: Thought of Harming Self 0-->never   [place]  Depression Score 0   Did any of your parents have problems with alcohol or drug use? No   Do any of your peers have problems with alcohol or drug use? No   Does your partner have problems with alcohol or drug use? No   Before you were pregnant did you have problems with alcohol or drug use? (past) No   In the past month, did you drink beer, wine, liquor or use any other drugs? (pregnancy) No   Maternal Screening call completed Yes   Call end time 113              Juan IBANEZ - Registered Nurse

## 2024-03-01 NOTE — OUTREACH NOTE
Maternal Screening Survey      Flowsheet Row Responses   Facility patient discharged from? [facility]   Attempt successful? No   Unsuccessful attempts Attempt 1              Juan IBANEZ - Registered Nurse

## 2024-03-02 LAB
BACTERIA UR CULT: NORMAL
BACTERIA UR CULT: NORMAL

## 2024-03-22 RX ORDER — LABETALOL 200 MG/1
400 TABLET, FILM COATED ORAL EVERY 8 HOURS SCHEDULED
Qty: 90 TABLET | Refills: 0 | Status: SHIPPED | OUTPATIENT
Start: 2024-03-22

## 2024-03-22 NOTE — TELEPHONE ENCOUNTER
Please see refill request for Labetalol.  Dr. Tadeo pt delivered 02/19/2024 and had severe pre-eclampsia.  NOV w/Dr. Daniels 04/04/2024.  Thank you.

## 2024-04-04 ENCOUNTER — POSTPARTUM VISIT (OUTPATIENT)
Dept: OBSTETRICS AND GYNECOLOGY | Age: 29
End: 2024-04-04
Payer: COMMERCIAL

## 2024-04-04 VITALS
HEIGHT: 64 IN | DIASTOLIC BLOOD PRESSURE: 70 MMHG | BODY MASS INDEX: 38 KG/M2 | WEIGHT: 222.6 LBS | SYSTOLIC BLOOD PRESSURE: 122 MMHG

## 2024-04-04 DIAGNOSIS — O14.13 PREECLAMPSIA, SEVERE, THIRD TRIMESTER: ICD-10-CM

## 2024-04-04 PROBLEM — J45.909 ASTHMA DURING PREGNANCY: Status: RESOLVED | Noted: 2023-12-06 | Resolved: 2024-04-04

## 2024-04-04 PROBLEM — O99.519 ASTHMA DURING PREGNANCY: Status: RESOLVED | Noted: 2023-12-06 | Resolved: 2024-04-04

## 2024-04-04 PROBLEM — Z34.90 PREGNANCY: Status: RESOLVED | Noted: 2024-02-17 | Resolved: 2024-04-04

## 2024-04-04 PROBLEM — Z34.90 SUPERVISION OF NORMAL PREGNANCY: Status: RESOLVED | Noted: 2023-12-06 | Resolved: 2024-04-04

## 2024-04-04 RX ORDER — BUSPIRONE HYDROCHLORIDE 7.5 MG/1
7.5 TABLET ORAL DAILY
COMMUNITY
Start: 2024-02-27

## 2024-04-04 NOTE — PROGRESS NOTES
"[facility]   Obstetrics and Gynecology   Postpartum Visit    2024    Patient: Wilda Evans          MR#:[id number]    History of Present Illness    Chief Complaint   Patient presents with    Postpartum Care     PP 6 wks  2024 Severe pre-eclampsia, Male \"Eduard\", Breast and formula bottle feeding, 2nd deg perineal repaired w/3-0 and 4-0 vicryl, No problems today       29 y.o. female  status post  24 presents for postpartum visit.  Delivery complicated by preeclampsia with severe features for which she received magnesium sulfate during and after delivery.  Blood pressure was ultimately controlled with labetalol 400 mg every 8 and Procardia XL 60 mg daily. She does feel lightheaded after some doses intermittently.    Mood stable on buspar and prozac.  Breast-feeding without issue.  Bleeding resolved.  No intercourse since delivery.  Baby Eduard is home from NICU 2 wks ago and doing great.  Going to peds.    Contraception: Mirena IUD  Vaccines: RI, VNI, s/p tdap  Pap: NIL 23  ________________________________________  Patient Active Problem List   Diagnosis    Acne    Preeclampsia, severe, third trimester     (normal spontaneous vaginal delivery)       Past Medical History:   Diagnosis Date    Anxiety     Asthma     Depression     Environmental and seasonal allergies     Polycystic ovary syndrome     Irregular cycles    Pregnancy 2024    Severe pre-eclampsia in third trimester 2024       Past Surgical History:   Procedure Laterality Date    WISDOM TOOTH EXTRACTION         Social History     Tobacco Use   Smoking Status Never    Passive exposure: Never   Smokeless Tobacco Never       has a current medication list which includes the following prescription(s): acetaminophen, albuterol sulfate hfa, buspirone, clindamycin-benzoyl peroxide, fluoxetine, ibuprofen, labetalol, montelukast, nifedipine cc, prenatal (classic) vitamin, and " "sennosides-docusate.  ________________________________________         Review of Systems   All other systems reviewed and are negative.      Objective     /70   Ht 162.6 cm (64\")   Wt 101 kg (222 lb 9.6 oz)   Breastfeeding Yes Comment: Breast and formula  BMI 38.21 kg/m²    BP Readings from Last 3 Encounters:   24 122/70   24 122/78   24 129/85      Wt Readings from Last 3 Encounters:   24 101 kg (222 lb 9.6 oz)   24 103 kg (226 lb)   24 105 kg (232 lb)      BMI: Estimated body mass index is 38.21 kg/m² as calculated from the following:    Height as of this encounter: 162.6 cm (64\").    Weight as of this encounter: 101 kg (222 lb 9.6 oz).    EXAM     General:     Patient appears well in NAD  Respiratory: Normal effort, no distress  Breast:  declined  Abdomen: Soft, NT, no acute findings  Pelvic:  perineum without signs of infection, uterus small and nontender  Ext:  No cyanosis or edema    Assessment:    Diagnoses and all orders for this visit:    1. Postpartum follow-up (Primary)    2. Preeclampsia, severe, third trimester    3.  (normal spontaneous vaginal delivery)    -Doing great postpartum  - Discussed stopping antihypertensive meds and following up in 1 week for blood pressure check  - Discussed all contraceptive methods.  Patient most interested in Mirena IUD.  Plan for insertion next week with blood pressure check.  Discussed expectation of irregular bleeding for up to 3 months after insertion.  Discussed taking ibuprofen prior to insertion.  Discussed avoiding unprotected sex for 2 weeks before and 1 week after insertion.    Plan:  Return in about 6 days (around 4/10/2024) for Check Mirena benefits, schedule insertion and BP check with me.      Yanique Daniels MD  2024 11:49 EDT  "

## 2024-04-10 ENCOUNTER — OFFICE VISIT (OUTPATIENT)
Dept: OBSTETRICS AND GYNECOLOGY | Age: 29
End: 2024-04-10
Payer: COMMERCIAL

## 2024-04-10 VITALS
HEIGHT: 64 IN | SYSTOLIC BLOOD PRESSURE: 124 MMHG | WEIGHT: 223 LBS | BODY MASS INDEX: 38.07 KG/M2 | DIASTOLIC BLOOD PRESSURE: 70 MMHG

## 2024-04-10 DIAGNOSIS — Z01.818 PREPROCEDURAL EXAMINATION: ICD-10-CM

## 2024-04-10 DIAGNOSIS — O14.13 PREECLAMPSIA, SEVERE, THIRD TRIMESTER: ICD-10-CM

## 2024-04-10 DIAGNOSIS — Z30.430 ENCOUNTER FOR INSERTION OF MIRENA IUD: ICD-10-CM

## 2024-04-10 LAB
B-HCG UR QL: NEGATIVE
EXPIRATION DATE: NORMAL
INTERNAL NEGATIVE CONTROL: NORMAL
INTERNAL POSITIVE CONTROL: NORMAL
Lab: NORMAL

## 2024-04-10 NOTE — PROGRESS NOTES
[facility]   Obstetrics and Gynecology   Postpartum Visit    4/10/2024    Patient: Wilda Evans          MR#:[id number]    History of Present Illness    Chief Complaint   Patient presents with    Postpartum Care     PP 7 wks B/P check and Mirena insertion, No problems today       29 y.o. female  status post  24 presents for postpartum visit.  Delivery complicated by preeclampsia with severe features for which she received magnesium sulfate during and after delivery.  Blood pressure was ultimately controlled with labetalol 400 mg every 8 and Procardia XL 60 mg daily.  She was experiencing lightheadedness at last visit after taking medication so both meds were stopped.  She feels much better.    Mood stable on buspar and prozac.  Breast-feeding without issue.  Bleeding resolved.  No intercourse since delivery.  Baby Eduard is doing great.  Going to peds.     Contraception: Mirena IUD  Vaccines: RI, VNI, s/p tdap  Pap: NIL 23  ________________________________________  Patient Active Problem List   Diagnosis    Acne    Preeclampsia, severe, third trimester     (normal spontaneous vaginal delivery)       Past Medical History:   Diagnosis Date    Anxiety     Asthma     Depression     Environmental and seasonal allergies     Polycystic ovary syndrome     Irregular cycles    Pregnancy 2024    Severe pre-eclampsia in third trimester 2024       Past Surgical History:   Procedure Laterality Date    WISDOM TOOTH EXTRACTION         Social History     Tobacco Use   Smoking Status Never    Passive exposure: Never   Smokeless Tobacco Never       has a current medication list which includes the following prescription(s): acetaminophen, albuterol sulfate hfa, buspirone, clindamycin-benzoyl peroxide, fluoxetine, ibuprofen, labetalol, montelukast, nifedipine cc, prenatal (classic) vitamin, sennosides-docusate, and mirena (52 mg).  ________________________________________         Review  "of Systems   All other systems reviewed and are negative.      Objective     /70   Ht 162.6 cm (64\")   Wt 101 kg (223 lb)   LMP  (LMP Unknown)   Breastfeeding Yes Comment: Breast and formula feeding  BMI 38.28 kg/m²    BP Readings from Last 3 Encounters:   04/10/24 124/70   04/04/24 122/70   02/26/24 122/78      Wt Readings from Last 3 Encounters:   04/10/24 101 kg (223 lb)   04/04/24 101 kg (222 lb 9.6 oz)   02/26/24 103 kg (226 lb)      BMI: Estimated body mass index is 38.28 kg/m² as calculated from the following:    Height as of this encounter: 162.6 cm (64\").    Weight as of this encounter: 101 kg (223 lb).    EXAM     General:     Patient appears well in NAD  Respiratory: Normal effort, no distress  Breast:  declined  Abdomen: Soft, NT, no acute findings  Pelvic:  perineum without signs of infection, uterus small and nontender  Ext:  No cyanosis or edema    IUD Insertion Procedure Note    Indication: Desires long acting reversible contraception     Procedure Details   Urine pregnancy test confirmed negative.  The risks (including infection, bleeding, pain, and uterine perforation) and benefits of the procedure were explained to the patient and verbal informed consent was obtained. Time out was performed.     Cervix was cleansed with Betadine. Allis clamp applied to anterior cervix.  Uterus sounded to 8 cm. IUD inserted without difficulty. Strings trimmed to 2-3 cm.    IUD Information:  Mirena, Lot # [id number], Expiration date 7/2026.    Condition:  Stable    Complications:  None, patient tolerated the procedure well    Plan:  The patient was advised to call for fever, prolonged or severe pain, or persistent bleeding. She was advised to use NSAIDs as needed for mild to moderate pain.  Discussed using back up form of contraception for 1 week.  Discussed expectation of irregular bleeding for 3-6 months but then generally resolves.  Follow up PRN.    Procedure time: 7 " minutes        Assessment:    Diagnoses and all orders for this visit:    1. Postpartum follow-up (Primary)  -     POC Pregnancy, Urine    2. Encounter for insertion of mirena IUD  -     POC Pregnancy, Urine    3. Preprocedural examination  -     POC Pregnancy, Urine    4. Preeclampsia, severe, third trimester    5.  (normal spontaneous vaginal delivery)    -Normotensive without treatment, continue without medication  - Doing great postpartum, no acute issues  - Mirena IUD inserted today without complication    Plan:  Return in about 1 year (around 4/10/2025), or if symptoms worsen or fail to improve, for Annual.      Yanique Daniels MD  4/10/2024 11:53 EDT

## 2025-05-15 ENCOUNTER — OFFICE VISIT (OUTPATIENT)
Dept: OBSTETRICS AND GYNECOLOGY | Age: 30
End: 2025-05-15
Payer: COMMERCIAL

## 2025-05-15 VITALS
BODY MASS INDEX: 38.14 KG/M2 | HEIGHT: 64 IN | DIASTOLIC BLOOD PRESSURE: 68 MMHG | WEIGHT: 223.4 LBS | SYSTOLIC BLOOD PRESSURE: 122 MMHG

## 2025-05-15 DIAGNOSIS — Z01.419 WELL WOMAN EXAM WITH ROUTINE GYNECOLOGICAL EXAM: Primary | ICD-10-CM

## 2025-05-15 DIAGNOSIS — Z97.5 IUD (INTRAUTERINE DEVICE) IN PLACE: ICD-10-CM

## 2025-05-15 PROBLEM — L70.9 ACNE: Status: RESOLVED | Noted: 2023-12-06 | Resolved: 2025-05-15

## 2025-05-15 PROBLEM — O14.13 PREECLAMPSIA, SEVERE, THIRD TRIMESTER: Status: RESOLVED | Noted: 2024-02-17 | Resolved: 2025-05-15

## 2025-05-15 PROCEDURE — 99395 PREV VISIT EST AGE 18-39: CPT | Performed by: STUDENT IN AN ORGANIZED HEALTH CARE EDUCATION/TRAINING PROGRAM

## 2025-05-15 RX ORDER — FLUOXETINE HYDROCHLORIDE 40 MG/1
40 CAPSULE ORAL DAILY
COMMUNITY
Start: 2025-03-19

## 2025-05-15 RX ORDER — DIPHENOXYLATE HYDROCHLORIDE AND ATROPINE SULFATE 2.5; .025 MG/1; MG/1
TABLET ORAL DAILY
COMMUNITY

## 2025-05-15 NOTE — PROGRESS NOTES
[facility]   Obstetrics and Gynecology   Routine Annual Visit    5/15/2025    Patient: Wilda Evans          MR#:[id number]    History of Present Illness    Chief Complaint   Patient presents with    Annual Exam     AE today, Last pap 2023 nml, Mirena placed 04/10/2024, No problems today       30 y.o. female  who presents for annual exam.  Mirena IUD in place since 4/10/2024.  Has random spotting but overall manageable.    Studies reviewed:  IGP, Rfx Aptima HPV ASCU (2023 15:27) NIL    Obstetric History:  OB History          1    Para   1    Term           1    AB        Living   1         SAB        IAB        Ectopic        Molar        Multiple   0    Live Births   1               Menstrual History:     No LMP recorded (lmp unknown). Patient has had an implant.       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, planning fellowship in cardiac anesthesia  Maybe another kid after he graduates fellowship    ________________________________________  Patient Active Problem List   Diagnosis   (none) - all problems resolved or deleted     Past Medical History:   Diagnosis Date    Anxiety     Asthma     Depression     Environmental and seasonal allergies     Polycystic ovary syndrome     Irregular cycles    Pregnancy 2024    Severe pre-eclampsia in third trimester 2024     Past Surgical History:   Procedure Laterality Date    WISDOM TOOTH EXTRACTION       Social History     Tobacco Use   Smoking Status Never    Passive exposure: Never   Smokeless Tobacco Never     Family History   Problem Relation Age of Onset    Diabetes Father     Thyroid disease Mother     Osteoporosis Maternal Aunt     Osteoporosis Maternal Uncle     Breast cancer Neg Hx     Ovarian cancer Neg Hx     Uterine cancer Neg Hx     Colon cancer Neg Hx     Deep vein thrombosis Neg Hx     Pulmonary embolism  Neg Hx     Congenital heart disease Neg Hx      Prior to Admission medications    Medication Sig Start Date End Date Taking? Authorizing Provider   acetaminophen (TYLENOL) 325 MG tablet Take 2 tablets by mouth Every 6 (Six) Hours As Needed for Mild Pain (Second Line: Mild pain unrelieved by ibuprofen. Stagger doses 3 hours after dose of ibuprofen.). 2/23/24   Yanique Daniels MD   albuterol sulfate  (90 Base) MCG/ACT inhaler Inhale 2 puffs Every 6 (Six) Hours As Needed.    Provider, MD Aman   busPIRone (BUSPAR) 7.5 MG tablet Take 1 tablet by mouth Daily. 2/27/24   ProviderAman MD   clindamycin-benzoyl peroxide (BenzaClin) 1-5 % gel Apply 1 application  topically to the appropriate area as directed 2 (Two) Times a Day. 12/6/23   Stephanie Tadeo MD   FLUoxetine (PROzac) 20 MG capsule Take 2 capsules by mouth Daily.    Emergency, Nurse Nima RN   ibuprofen (ADVIL,MOTRIN) 600 MG tablet Take 1 tablet by mouth Every 6 (Six) Hours As Needed for Mild Pain (First Line: Mild pain.). 2/23/24   Yanique Daniels MD   labetalol (NORMODYNE) 200 MG tablet Take 2 tablets by mouth Every 8 (Eight) Hours. 3/22/24   Tanya Porter APRN   montelukast (SINGULAIR) 10 MG tablet Take 1 tablet by mouth Every Night.    Emergency, Nurse Epic, RN   NIFEdipine XL (ADALAT CC) 60 MG 24 hr tablet Take 1 tablet by mouth Daily. 2/24/24   Yanique Daniels MD   prenatal vitamin (prenatal, CLASSIC, vitamin) tablet Take 1 tablet by mouth Daily.    Provider, MD Aman   sennosides-docusate (senna-docusate sodium) 8.6-50 MG per tablet Take 1 tablet by mouth 2 (Two) Times a Day As Needed for Constipation. 2/23/24   Yanique Daniels MD     ________________________________________    Current contraception: IUD  History of abnormal Pap smear: no  Family history of uterine or ovarian cancer: no  Family History of colon cancer/colon polyps: no  History of abnormal mammogram: no    The following portions of the  "patient's history were reviewed and updated as appropriate: allergies, current medications, past family history, past medical history, past social history, past surgical history, and problem list.    Review of Systems   All other systems reviewed and are negative.           Objective     /68   Ht 162.6 cm (64\")   Wt 101 kg (223 lb 6.4 oz)   LMP  (LMP Unknown) Comment: Mirena 04/10/2024  Breastfeeding No   BMI 38.35 kg/m²    BP Readings from Last 3 Encounters:   05/15/25 122/68   04/10/24 124/70   04/04/24 122/70      Wt Readings from Last 3 Encounters:   05/15/25 101 kg (223 lb 6.4 oz)   04/10/24 101 kg (223 lb)   04/04/24 101 kg (222 lb 9.6 oz)        BMI: Estimated body mass index is 38.35 kg/m² as calculated from the following:    Height as of this encounter: 162.6 cm (64\").    Weight as of this encounter: 101 kg (223 lb 6.4 oz).    Physical Exam  Vitals and nursing note reviewed.   Constitutional:       General: She is not in acute distress.     Appearance: Normal appearance.   HENT:      Head: Normocephalic and atraumatic.   Eyes:      Extraocular Movements: Extraocular movements intact.   Cardiovascular:      Rate and Rhythm: Normal rate and regular rhythm.      Pulses: Normal pulses.      Heart sounds: No murmur heard.  Pulmonary:      Effort: Pulmonary effort is normal. No respiratory distress.      Breath sounds: Normal breath sounds.   Chest:   Breasts:     Right: Normal. No mass, nipple discharge, skin change or tenderness.      Left: Normal. No mass, nipple discharge, skin change or tenderness.   Abdominal:      General: There is no distension.      Palpations: Abdomen is soft. There is no mass.      Tenderness: There is no abdominal tenderness.   Genitourinary:     General: Normal vulva.      Labia:         Right: No rash or lesion.         Left: No rash or lesion.       Urethra: No prolapse, urethral swelling or urethral lesion.      Vagina: Normal.      Cervix: Normal.      Uterus: Normal.  "      Adnexa: Right adnexa normal and left adnexa normal.      Comments: Bladder: no masses or tenderness  Perineum/Anus: no masses, lesions, or skin changes    IUD strings visualized at cervix  Musculoskeletal:         General: No swelling. Normal range of motion.      Cervical back: Normal range of motion.   Lymphadenopathy:      Upper Body:      Right upper body: No axillary adenopathy.      Left upper body: No axillary adenopathy.   Skin:     General: Skin is warm and dry.   Neurological:      General: No focal deficit present.      Mental Status: She is alert and oriented to person, place, and time.   Psychiatric:         Mood and Affect: Mood normal.         Behavior: Behavior normal.         As part of wellness and prevention, the following topics were discussed with the patient:  Encouraged self breast exam  Physical activity and regular exercised encouraged.   Injury prevention discussed.  Healthy weight discussed.  Nutrition discussed.  Substance abuse/misuse discussed.  Sexual behavior/safe practices discussed.   Sexual transmitted disease prevention   Contraception discussed.   Mental health discussed.           Assessment:  Diagnoses and all orders for this visit:    1. Well woman exam with routine gynecological exam (Primary)    2. IUD (intrauterine device) in place      - Breast and pelvic exam normal  - Pap up-to-date  - Discussed that menstrual pattern is normal with IUD.  She is happy otherwise we will leave in place.  Strings visualized.  - Declined STD screening    Plan:  Return in about 1 year (around 5/15/2026) for Annual.      Yanique Daniels MD  5/15/2025 16:05 EDT